# Patient Record
Sex: FEMALE | Race: WHITE | Employment: FULL TIME | URBAN - METROPOLITAN AREA
[De-identification: names, ages, dates, MRNs, and addresses within clinical notes are randomized per-mention and may not be internally consistent; named-entity substitution may affect disease eponyms.]

---

## 2019-05-14 ENCOUNTER — HOSPITAL ENCOUNTER (OUTPATIENT)
Dept: GENERAL RADIOLOGY | Age: 34
Discharge: HOME | End: 2019-05-14
Attending: OBSTETRICS & GYNECOLOGY
Payer: COMMERCIAL

## 2019-05-14 DIAGNOSIS — N92.6 IRREGULAR MENSES: ICD-10-CM

## 2019-05-14 PROCEDURE — 74011636320 HC RX REV CODE- 636/320: Performed by: OBSTETRICS & GYNECOLOGY

## 2019-05-14 PROCEDURE — 58340 CATHETER FOR HYSTEROGRAPHY: CPT

## 2019-05-14 RX ADMIN — IOPAMIDOL 10 ML: 612 INJECTION, SOLUTION INTRAVENOUS at 08:36

## 2019-07-30 LAB
HBSAG, EXTERNAL: NEGATIVE
HIV, EXTERNAL: NEGATIVE
RPR, EXTERNAL: NON REACTIVE
RUBELLA, EXTERNAL: NORMAL
TYPE, ABO & RH, EXTERNAL: NORMAL

## 2020-02-05 LAB — GRBS, EXTERNAL: NEGATIVE

## 2020-02-23 ENCOUNTER — HOSPITAL ENCOUNTER (EMERGENCY)
Age: 35
Discharge: HOME | End: 2020-02-23
Attending: OBSTETRICS & GYNECOLOGY | Admitting: OBSTETRICS & GYNECOLOGY
Payer: COMMERCIAL

## 2020-02-23 VITALS
DIASTOLIC BLOOD PRESSURE: 64 MMHG | HEART RATE: 79 BPM | HEIGHT: 66 IN | TEMPERATURE: 98 F | SYSTOLIC BLOOD PRESSURE: 111 MMHG | RESPIRATION RATE: 16 BRPM | WEIGHT: 186 LBS | BODY MASS INDEX: 29.89 KG/M2

## 2020-02-23 LAB
ALBUMIN SERPL-MCNC: 2.6 G/DL (ref 3.4–5)
ALBUMIN/GLOB SERPL: 0.7 {RATIO} (ref 0.8–1.7)
ALP SERPL-CCNC: 193 U/L (ref 45–117)
ALT SERPL-CCNC: 16 U/L (ref 13–56)
ANION GAP SERPL CALC-SCNC: 9 MMOL/L (ref 3–18)
APPEARANCE UR: CLEAR
AST SERPL-CCNC: 14 U/L (ref 10–38)
BILIRUB SERPL-MCNC: 0.2 MG/DL (ref 0.2–1)
BILIRUB UR QL: NEGATIVE
BUN SERPL-MCNC: 14 MG/DL (ref 7–18)
BUN/CREAT SERPL: 23 (ref 12–20)
CALCIUM SERPL-MCNC: 8.2 MG/DL (ref 8.5–10.1)
CHLORIDE SERPL-SCNC: 107 MMOL/L (ref 100–111)
CO2 SERPL-SCNC: 22 MMOL/L (ref 21–32)
COLOR UR: YELLOW
CREAT SERPL-MCNC: 0.6 MG/DL (ref 0.6–1.3)
ERYTHROCYTE [DISTWIDTH] IN BLOOD BY AUTOMATED COUNT: 13.4 % (ref 11.6–14.5)
GLOBULIN SER CALC-MCNC: 3.5 G/DL (ref 2–4)
GLUCOSE SERPL-MCNC: 95 MG/DL (ref 74–99)
GLUCOSE UR QL STRIP.AUTO: NEGATIVE MG/DL
HCT VFR BLD AUTO: 30.7 % (ref 35–45)
HGB BLD-MCNC: 9.9 G/DL (ref 12–16)
KETONES UR-MCNC: NEGATIVE MG/DL
LEUKOCYTE ESTERASE UR QL STRIP: NEGATIVE
MCH RBC QN AUTO: 27.6 PG (ref 24–34)
MCHC RBC AUTO-ENTMCNC: 32.2 G/DL (ref 31–37)
MCV RBC AUTO: 85.5 FL (ref 74–97)
NITRITE UR QL: NEGATIVE
PH UR: 6.5 [PH] (ref 5–9)
PLATELET # BLD AUTO: 193 K/UL (ref 135–420)
PMV BLD AUTO: 11.9 FL (ref 9.2–11.8)
POTASSIUM SERPL-SCNC: 3.8 MMOL/L (ref 3.5–5.5)
PROT SERPL-MCNC: 6.1 G/DL (ref 6.4–8.2)
PROT UR QL: NEGATIVE MG/DL
RBC # BLD AUTO: 3.59 M/UL (ref 4.2–5.3)
RBC # UR STRIP: NEGATIVE /UL
SERVICE CMNT-IMP: NORMAL
SODIUM SERPL-SCNC: 138 MMOL/L (ref 136–145)
SP GR UR: 1.01 (ref 1–1.02)
URATE SERPL-MCNC: 4.3 MG/DL (ref 2.6–7.2)
UROBILINOGEN UR QL: 0.2 EU/DL (ref 0.2–1)
WBC # BLD AUTO: 8.9 K/UL (ref 4.6–13.2)

## 2020-02-23 PROCEDURE — 83615 LACTATE (LD) (LDH) ENZYME: CPT

## 2020-02-23 PROCEDURE — 81003 URINALYSIS AUTO W/O SCOPE: CPT

## 2020-02-23 PROCEDURE — 59025 FETAL NON-STRESS TEST: CPT

## 2020-02-23 PROCEDURE — 84550 ASSAY OF BLOOD/URIC ACID: CPT

## 2020-02-23 PROCEDURE — 85027 COMPLETE CBC AUTOMATED: CPT

## 2020-02-23 PROCEDURE — 80053 COMPREHEN METABOLIC PANEL: CPT

## 2020-02-23 PROCEDURE — 74011250637 HC RX REV CODE- 250/637: Performed by: ADVANCED PRACTICE MIDWIFE

## 2020-02-23 PROCEDURE — 99282 EMERGENCY DEPT VISIT SF MDM: CPT

## 2020-02-23 RX ORDER — BUTALBITAL, ACETAMINOPHEN AND CAFFEINE 50; 325; 40 MG/1; MG/1; MG/1
1 TABLET ORAL ONCE
Status: COMPLETED | OUTPATIENT
Start: 2020-02-23 | End: 2020-02-23

## 2020-02-23 RX ORDER — CETIRIZINE HCL 10 MG
10 TABLET ORAL
COMMUNITY

## 2020-02-23 RX ADMIN — BUTALBITAL, ACETAMINOPHEN, AND CAFFEINE 1 TABLET: 50; 325; 40 TABLET ORAL at 19:51

## 2020-02-24 LAB — LDH SERPL L TO P-CCNC: 159 U/L (ref 81–234)

## 2020-02-24 NOTE — DISCHARGE INSTRUCTIONS
Patient Education   Patient Education   Patient Education        Week 37 of Your Pregnancy: Care Instructions  Your Care Instructions    You are near the end of your pregnancy--and you're probably pretty uncomfortable. It may be harder to walk around. Lying down probably isn't comfortable either. You may have trouble getting to sleep or staying asleep. Most women deliver their babies between 40 and 41 weeks. This is a good time to think about packing a bag for the hospital with items you'll need. Then you'll be ready when labor starts. Follow-up care is a key part of your treatment and safety. Be sure to make and go to all appointments, and call your doctor if you are having problems. It's also a good idea to know your test results and keep a list of the medicines you take. How can you care for yourself at home? Learn about breastfeeding  · Breastfeeding is best for your baby and good for you. · Breast milk has antibodies to help your baby fight infections. · Mothers who breastfeed often lose weight faster, because making milk burns calories. · Learning the best ways to hold your baby will make breastfeeding easier. · Let your partner bathe and diaper the baby to keep your partner from feeling left out. Snuggle together when you breastfeed. · You may want to learn how to use a breast pump and store your milk. · If you choose to bottle feed, make the feeding feel like breastfeeding so you can bond with your baby. Always hold your baby and the bottle. Do not prop bottles or let your baby fall asleep with a bottle. Learn about crying  · It is common for babies to cry for 1 to 3 hours a day. Some cry more, some cry less. · Babies don't cry to make you upset or because you are a bad parent. · Crying is how your baby communicates. Your baby may be hungry; have gas; need a diaper change; or feel cold, warm, tired, lonely, or tense. Sometimes babies cry for unknown reasons.   · If you respond to your baby's needs, he or she will learn to trust you. · Try to stay calm when your baby cries. Your baby may get more upset if he or she senses that you are upset. Know how to care for your   · Your baby's umbilical cord stump will drop off on its own, usually between 1 and 2 weeks. To care for your baby's umbilical cord area:  ? Clean the area at the bottom of the cord 2 or 3 times a day. ? Pay special attention to the area where the cord attaches to the skin. ? Keep the diaper folded below the cord. ? Use a damp washcloth or cotton ball to sponge bathe your baby until the stump has come off. · Your baby's first dark stool is called meconium. After the meconium is passed, your baby will develop his or her own bowel pattern. ? Some babies, especially  babies, have several bowel movements a day. Others have one or two a day, or one every 2 to 3 days. ?  babies often have loose, yellow stools. Formula-fed babies have more formed stools. ? If your baby's stools look like little pellets, he or she is constipated. After 2 days of constipation, call your baby's doctor. · If your baby will be circumcised, you can care for him at home. ? Gently rinse his penis with warm water after every diaper change. Do not try to remove the film that forms on the penis. This film will go away on its own. Pat dry. ? Put petroleum ointment, such as Vaseline, on the area of the diaper that will touch your baby's penis. This will keep the diaper from sticking to your baby. ? Ask the doctor about giving your baby acetaminophen (Tylenol) for pain. Where can you learn more? Go to http://layla-avery.info/. Enter 68 21 [id number] in the search box to learn more about \"Week 37 of Your Pregnancy: Care Instructions. \"  Current as of: May 29, 2019  Content Version: 12.2  © 1708-8666 [employer].  Care instructions adapted under license by [employer] (which disclaims liability or warranty for this information). If you have questions about a medical condition or this instruction, always ask your healthcare professional. [address] any warranty or liability for your use of this information. Migraine Headache: Care Instructions  Your Care Instructions  Migraines are painful, throbbing headaches that often start on one side of the head. They may cause nausea and vomiting and make you sensitive to light, sound, or smell. Without treatment, migraines can last from 4 hours to a few days. Medicines can help prevent migraines or stop them after they have started. Your doctor can help you find which ones work best for you. Follow-up care is a key part of your treatment and safety. Be sure to make and go to all appointments, and call your doctor if you are having problems. It's also a good idea to know your test results and keep a list of the medicines you take. How can you care for yourself at home? · Do not drive if you have taken a prescription pain medicine. · Rest in a quiet, dark room until your headache is gone. Close your eyes, and try to relax or go to sleep. Don't watch TV or read. · Put a cold, moist cloth or cold pack on the painful area for 10 to 20 minutes at a time. Put a thin cloth between the cold pack and your skin. · Use a warm, moist towel or a heating pad set on low to relax tight shoulder and neck muscles. · Have someone gently massage your neck and shoulders. · Take your medicines exactly as prescribed. Call your doctor if you think you are having a problem with your medicine. You will get more details on the specific medicines your doctor prescribes. · Be careful not to take pain medicine more often than the instructions allow. You could get worse or more frequent headaches when the medicine wears off. To prevent migraines  · Keep a headache diary so you can figure out what triggers your headaches.  Avoiding triggers may help you prevent headaches. Record when each headache began, how long it lasted, and what the pain was like. (Was it throbbing, aching, stabbing, or dull?) Write down any other symptoms you had with the headache, such as nausea, flashing lights or dark spots, or sensitivity to bright light or loud noise. Note if the headache occurred near your period. List anything that might have triggered the headache. Triggers may include certain foods (chocolate, cheese, wine) or odors, smoke, bright light, stress, or lack of sleep. · If your doctor has prescribed medicine for your migraines, take it as directed. You may have medicine that you take only when you get a migraine and medicine that you take all the time to help prevent migraines. ? If your doctor has prescribed medicine for when you get a headache, take it at the first sign of a migraine, unless your doctor has given you other instructions. ? If your doctor has prescribed medicine to prevent migraines, take it exactly as prescribed. Call your doctor if you think you are having a problem with your medicine. · Find healthy ways to deal with stress. Migraines are most common during or right after stressful times. Take time to relax before and after you do something that has caused a migraine in the past.  · Try to keep your muscles relaxed by keeping good posture. Check your jaw, face, neck, and shoulder muscles for tension. Try to relax them. When you sit at a desk, change positions often. And make sure to stretch for 30 seconds each hour. · Get plenty of sleep and exercise. · Eat meals on a regular schedule. Avoid foods and drinks that often trigger migraines. These include chocolate, alcohol (especially red wine and port), aspartame, monosodium glutamate (MSG), and some additives found in foods (such as hot dogs, manriquez, cold cuts, aged cheeses, and pickled foods). · Limit caffeine. Don't drink too much coffee, tea, or soda. But don't quit caffeine suddenly.  That can also give you migraines. · Do not smoke or allow others to smoke around you. If you need help quitting, talk to your doctor about stop-smoking programs and medicines. These can increase your chances of quitting for good. · If you are taking birth control pills or hormone therapy, talk to your doctor about whether they are triggering your migraines. When should you call for help? Call 911 anytime you think you may need emergency care. For example, call if:    · You have signs of a stroke. These may include:  ? Sudden numbness, paralysis, or weakness in your face, arm, or leg, especially on only one side of your body. ? Sudden vision changes. ? Sudden trouble speaking. ? Sudden confusion or trouble understanding simple statements. ? Sudden problems with walking or balance. ? A sudden, severe headache that is different from past headaches.    Call your doctor now or seek immediate medical care if:    · You have new or worse nausea and vomiting.     · You have a new or higher fever.     · Your headache gets much worse.    Watch closely for changes in your health, and be sure to contact your doctor if:    · You are not getting better after 2 days (48 hours). Where can you learn more? Go to http://layla-avery.info/. Enter G343 in the search box to learn more about \"Migraine Headache: Care Instructions. \"  Current as of: March 28, 2019  Content Version: 12.2  © 0913-3113 [employer]. Care instructions adapted under license by [employer] (which disclaims liability or warranty for this information). If you have questions about a medical condition or this instruction, always ask your healthcare professional. [place] [address] any warranty or liability for your use of this information. Headache: Care Instructions  Your Care Instructions    Headaches have many possible causes.  Most headaches aren't a sign of a more serious problem, and they will get better on their own. Home treatment may help you feel better faster. The doctor has checked you carefully, but problems can develop later. If you notice any problems or new symptoms, get medical treatment right away. Follow-up care is a key part of your treatment and safety. Be sure to make and go to all appointments, and call your doctor if you are having problems. It's also a good idea to know your test results and keep a list of the medicines you take. How can you care for yourself at home? · Do not drive if you have taken a prescription pain medicine. · Rest in a quiet, dark room until your headache is gone. Close your eyes and try to relax or go to sleep. Don't watch TV or read. · Put a cold, moist cloth or cold pack on the painful area for 10 to 20 minutes at a time. Put a thin cloth between the cold pack and your skin. · Use a warm, moist towel or a heating pad set on low to relax tight shoulder and neck muscles. · Have someone gently massage your neck and shoulders. · Take pain medicines exactly as directed. ? If the doctor gave you a prescription medicine for pain, take it as prescribed. ? If you are not taking a prescription pain medicine, ask your doctor if you can take an over-the-counter medicine. · Be careful not to take pain medicine more often than the instructions allow, because you may get worse or more frequent headaches when the medicine wears off. · Do not ignore new symptoms that occur with a headache, such as a fever, weakness or numbness, vision changes, or confusion. These may be signs of a more serious problem. To prevent headaches  · Keep a headache diary so you can figure out what triggers your headaches. Avoiding triggers may help you prevent headaches. Record when each headache began, how long it lasted, and what the pain was like (throbbing, aching, stabbing, or dull).  Write down any other symptoms you had with the headache, such as nausea, flashing lights or dark spots, or sensitivity to bright light or loud noise. Note if the headache occurred near your period. List anything that might have triggered the headache, such as certain foods (chocolate, cheese, wine) or odors, smoke, bright light, stress, or lack of sleep. · Find healthy ways to deal with stress. Headaches are most common during or right after stressful times. Take time to relax before and after you do something that has caused a headache in the past.  · Try to keep your muscles relaxed by keeping good posture. Check your jaw, face, neck, and shoulder muscles for tension, and try relaxing them. When sitting at a desk, change positions often, and stretch for 30 seconds each hour. · Get plenty of sleep and exercise. · Eat regularly and well. Long periods without food can trigger a headache. · Treat yourself to a massage. Some people find that regular massages are very helpful in relieving tension. · Limit caffeine by not drinking too much coffee, tea, or soda. But don't quit caffeine suddenly, because that can also give you headaches. · Reduce eyestrain from computers by blinking frequently and looking away from the computer screen every so often. Make sure you have proper eyewear and that your monitor is set up properly, about an arm's length away. · Seek help if you have depression or anxiety. Your headaches may be linked to these conditions. Treatment can both prevent headaches and help with symptoms of anxiety or depression. When should you call for help? Call 911 anytime you think you may need emergency care. For example, call if:    · You have signs of a stroke. These may include:  ? Sudden numbness, paralysis, or weakness in your face, arm, or leg, especially on only one side of your body. ? Sudden vision changes. ? Sudden trouble speaking. ? Sudden confusion or trouble understanding simple statements. ? Sudden problems with walking or balance.   ? A sudden, severe headache that is different from past headaches.    Call your doctor now or seek immediate medical care if:    · You have a new or worse headache.     · Your headache gets much worse. Where can you learn more? Go to http://layla-avery.info/. Enter M271 in the search box to learn more about \"Headache: Care Instructions. \"  Current as of: March 28, 2019  Content Version: 12.2  © 1578-7559 [employer]. Care instructions adapted under license by [employer] (which disclaims liability or warranty for this information). If you have questions about a medical condition or this instruction, always ask your healthcare professional. [place] [address] any warranty or liability for your use of this information.

## 2020-02-24 NOTE — PROGRESS NOTES
C/o f headache 08/10. Stabbing pain from right temple moving to the back. Pt states she took tylenol with no relief. She some times has sinus headaches, but this time it feels different. Pt denies blood pressure problems, no bleeding or leaking any fluid. No swelling noted in extremeties    0736 Spoke with FLORY Copeland CNM order for pih labs 1 Fioricet    0900:  PIH labs are back, pt states Fioricet did not help h/a    0910: Spoke with FLORY Copeland CNM re pt status. Order to discharge pt home to rest and take medication for sinuses    0926:  Pt states that fiorecet did start to help she scales her pain at 03/10.   Discharge instructions discussed with pt, pt expressed understanding and ambulated off unit with spouse

## 2020-02-24 NOTE — PROGRESS NOTES
1850  at 39.1 weeks arrives to unit with complaints of a HA. Pt taken to triage 3 for assessment. 190 Bedside and verbal report given to HAYLIE Hampton

## 2020-02-25 ENCOUNTER — HOSPITAL ENCOUNTER (INPATIENT)
Age: 35
LOS: 4 days | Discharge: HOME | End: 2020-02-29
Attending: OBSTETRICS & GYNECOLOGY | Admitting: OBSTETRICS & GYNECOLOGY
Payer: COMMERCIAL

## 2020-02-25 PROBLEM — Z34.93 NORMAL IUP (INTRAUTERINE PREGNANCY) ON PRENATAL ULTRASOUND, THIRD TRIMESTER: Status: ACTIVE | Noted: 2020-02-25

## 2020-02-25 PROBLEM — R51.9 HEADACHE IN PREGNANCY, ANTEPARTUM, THIRD TRIMESTER: Status: ACTIVE | Noted: 2020-02-25

## 2020-02-25 PROBLEM — O26.893 HEADACHE IN PREGNANCY, ANTEPARTUM, THIRD TRIMESTER: Status: ACTIVE | Noted: 2020-02-25

## 2020-02-25 LAB
ABO + RH BLD: NORMAL
ALBUMIN SERPL-MCNC: 2.4 G/DL (ref 3.4–5)
ALBUMIN/GLOB SERPL: 0.6 {RATIO} (ref 0.8–1.7)
ALP SERPL-CCNC: 197 U/L (ref 45–117)
ALT SERPL-CCNC: 18 U/L (ref 13–56)
ANION GAP SERPL CALC-SCNC: 7 MMOL/L (ref 3–18)
AST SERPL-CCNC: 21 U/L (ref 10–38)
BASOPHILS # BLD: 0 K/UL (ref 0–0.1)
BASOPHILS NFR BLD: 0 % (ref 0–2)
BILIRUB SERPL-MCNC: 0.2 MG/DL (ref 0.2–1)
BLOOD GROUP ANTIBODIES SERPL: NORMAL
BUN SERPL-MCNC: 12 MG/DL (ref 7–18)
BUN/CREAT SERPL: 16 (ref 12–20)
CALCIUM SERPL-MCNC: 8.3 MG/DL (ref 8.5–10.1)
CHLORIDE SERPL-SCNC: 108 MMOL/L (ref 100–111)
CO2 SERPL-SCNC: 23 MMOL/L (ref 21–32)
CREAT SERPL-MCNC: 0.74 MG/DL (ref 0.6–1.3)
DIFFERENTIAL METHOD BLD: ABNORMAL
EOSINOPHIL # BLD: 0.1 K/UL (ref 0–0.4)
EOSINOPHIL NFR BLD: 1 % (ref 0–5)
ERYTHROCYTE [DISTWIDTH] IN BLOOD BY AUTOMATED COUNT: 13.6 % (ref 11.6–14.5)
GLOBULIN SER CALC-MCNC: 3.7 G/DL (ref 2–4)
GLUCOSE SERPL-MCNC: 80 MG/DL (ref 74–99)
HCT VFR BLD AUTO: 29.7 % (ref 35–45)
HGB BLD-MCNC: 9.6 G/DL (ref 12–16)
LDH SERPL L TO P-CCNC: 206 U/L (ref 81–234)
LYMPHOCYTES # BLD: 1.6 K/UL (ref 0.9–3.6)
LYMPHOCYTES NFR BLD: 16 % (ref 21–52)
MCH RBC QN AUTO: 27.7 PG (ref 24–34)
MCHC RBC AUTO-ENTMCNC: 32.3 G/DL (ref 31–37)
MCV RBC AUTO: 85.6 FL (ref 74–97)
MONOCYTES # BLD: 0.6 K/UL (ref 0.05–1.2)
MONOCYTES NFR BLD: 6 % (ref 3–10)
NEUTS SEG # BLD: 7.8 K/UL (ref 1.8–8)
NEUTS SEG NFR BLD: 77 % (ref 40–73)
PLATELET # BLD AUTO: 200 K/UL (ref 135–420)
PMV BLD AUTO: 12.2 FL (ref 9.2–11.8)
POTASSIUM SERPL-SCNC: 4.4 MMOL/L (ref 3.5–5.5)
PROT SERPL-MCNC: 6.1 G/DL (ref 6.4–8.2)
RBC # BLD AUTO: 3.47 M/UL (ref 4.2–5.3)
SODIUM SERPL-SCNC: 138 MMOL/L (ref 136–145)
SPECIMEN EXP DATE BLD: NORMAL
URATE SERPL-MCNC: 4.8 MG/DL (ref 2.6–7.2)
WBC # BLD AUTO: 10.1 K/UL (ref 4.6–13.2)

## 2020-02-25 PROCEDURE — 84550 ASSAY OF BLOOD/URIC ACID: CPT

## 2020-02-25 PROCEDURE — 80053 COMPREHEN METABOLIC PANEL: CPT

## 2020-02-25 PROCEDURE — 59200 INSERT CERVICAL DILATOR: CPT

## 2020-02-25 PROCEDURE — 86900 BLOOD TYPING SEROLOGIC ABO: CPT

## 2020-02-25 PROCEDURE — 85025 COMPLETE CBC W/AUTO DIFF WBC: CPT

## 2020-02-25 PROCEDURE — 74011250636 HC RX REV CODE- 250/636: Performed by: MIDWIFE

## 2020-02-25 PROCEDURE — 59025 FETAL NON-STRESS TEST: CPT

## 2020-02-25 PROCEDURE — 77030028565 HC CATH CERV RIPNG BLN COOK -B

## 2020-02-25 PROCEDURE — 83615 LACTATE (LD) (LDH) ENZYME: CPT

## 2020-02-25 PROCEDURE — 65270000029 HC RM PRIVATE

## 2020-02-25 RX ORDER — NALBUPHINE HYDROCHLORIDE 10 MG/ML
10 INJECTION, SOLUTION INTRAMUSCULAR; INTRAVENOUS; SUBCUTANEOUS
Status: DISCONTINUED | OUTPATIENT
Start: 2020-02-25 | End: 2020-02-26 | Stop reason: HOSPADM

## 2020-02-25 RX ORDER — BUTORPHANOL TARTRATE 2 MG/ML
2 INJECTION INTRAMUSCULAR; INTRAVENOUS
Status: DISCONTINUED | OUTPATIENT
Start: 2020-02-25 | End: 2020-02-26 | Stop reason: HOSPADM

## 2020-02-25 RX ORDER — LIDOCAINE HYDROCHLORIDE 10 MG/ML
20 INJECTION, SOLUTION EPIDURAL; INFILTRATION; INTRACAUDAL; PERINEURAL AS NEEDED
Status: DISCONTINUED | OUTPATIENT
Start: 2020-02-25 | End: 2020-02-26 | Stop reason: HOSPADM

## 2020-02-25 RX ORDER — METHYLERGONOVINE MALEATE 0.2 MG/ML
0.2 INJECTION INTRAVENOUS AS NEEDED
Status: DISCONTINUED | OUTPATIENT
Start: 2020-02-25 | End: 2020-02-26 | Stop reason: HOSPADM

## 2020-02-25 RX ORDER — TERBUTALINE SULFATE 1 MG/ML
0.25 INJECTION SUBCUTANEOUS
Status: DISCONTINUED | OUTPATIENT
Start: 2020-02-25 | End: 2020-02-26 | Stop reason: HOSPADM

## 2020-02-25 RX ORDER — MINERAL OIL
30 OIL (ML) ORAL AS NEEDED
Status: DISCONTINUED | OUTPATIENT
Start: 2020-02-25 | End: 2020-02-26 | Stop reason: HOSPADM

## 2020-02-25 RX ORDER — SODIUM CHLORIDE, SODIUM LACTATE, POTASSIUM CHLORIDE, CALCIUM CHLORIDE 600; 310; 30; 20 MG/100ML; MG/100ML; MG/100ML; MG/100ML
125 INJECTION, SOLUTION INTRAVENOUS CONTINUOUS
Status: DISCONTINUED | OUTPATIENT
Start: 2020-02-25 | End: 2020-02-26 | Stop reason: HOSPADM

## 2020-02-25 RX ORDER — OXYTOCIN/0.9 % SODIUM CHLORIDE 20/1000 ML
125 PLASTIC BAG, INJECTION (ML) INTRAVENOUS CONTINUOUS
Status: DISCONTINUED | OUTPATIENT
Start: 2020-02-25 | End: 2020-02-26 | Stop reason: HOSPADM

## 2020-02-25 RX ORDER — OXYTOCIN/0.9 % SODIUM CHLORIDE 20/1000 ML
999 PLASTIC BAG, INJECTION (ML) INTRAVENOUS ONCE
Status: ACTIVE | OUTPATIENT
Start: 2020-02-25 | End: 2020-02-25

## 2020-02-25 RX ORDER — ONDANSETRON 2 MG/ML
4 INJECTION INTRAMUSCULAR; INTRAVENOUS
Status: DISCONTINUED | OUTPATIENT
Start: 2020-02-25 | End: 2020-02-26 | Stop reason: HOSPADM

## 2020-02-25 RX ADMIN — BUTORPHANOL TARTRATE 2 MG: 2 INJECTION, SOLUTION INTRAMUSCULAR; INTRAVENOUS at 11:03

## 2020-02-25 NOTE — H&P
History & Physical    Name: Heather Luciano MRN: [id number]  SSN: [id number]    YOB: 1985  Age: 36 y.o. Sex: female        Subjective:     Estimated Date of Delivery: 20  OB History        1    Para        Term                AB        Living           SAB        TAB        Ectopic        Molar        Multiple        Live Births                      Ms. Brice Hoover is admitted with pregnancy at 39w3d for induction of labor d/t persistent H/A. Prenatal course was complicated by Positive quad screen, declined amnio/NIPT. Kaitlin Simpson Please see prenatal records for details. No Known Allergies    Objective:     Vitals: There were no vitals filed for this visit. Physical Exam:  Patient without distress. Back: costovertebral angle tenderness absent  Abdomen: soft, nontender  Fundus: soft and non tender  Perineum: blood absent, amniotic fluid absent  Cervical Exam: Closed/Thick/High, per office visit  Membranes:  Intact  Fetal Heart Rate & Contraction pattern: Baseline: 140 per minute  Variability: moderate  Accelerations: yes  Decelerations: none  Uterine contractions: none    Prenatal Labs:   No results found for: RUBELLAEXT, GRBSEXT, HBSAGEXT, HIVEXT, RPREXT, GONNOEXT, CHLAMEXT      Assessment/Plan:     Plan: Admit for Reassuring fetal status. IOL for persistent H/A; Bps WNL, will redraw PIH labs and treat headache. Cont EFM  IV pain meds/ELMIRA per request  Cook balloon for cervical ripening  Group B Strep was negative.     Signed By:  Mariangel Alejo CNM     2020

## 2020-02-25 NOTE — PROGRESS NOTES
Pt back from the bathroom. Nurse at bedside and EFM adjusted. Pt turned to right lateral recumbent position. Pt tolerated well. GARY Olsen CNM at bedside and pt remains right lateral recumbent position and no further orders received at this time.

## 2020-02-25 NOTE — PROGRESS NOTES
Ander Bradley CNM at bedside for SVE and placement of Cook balloon. Cook balloon placed with 80mL of sterile saline instilled into uterine balloon and 80mL of sterile saline instilled into vaginal balloon. Pt tolerated well. Pt requesting IV pain medication and stadol given per order. Pt denies further needs at this time.

## 2020-02-25 NOTE — PROGRESS NOTES
presents at 39.3 weeks gestation for induction due to headache that will not go away. EFM applied. Hx confirmed. Pt oriented to room.

## 2020-02-25 NOTE — PROCEDURES
Discussed risks and benefits of Cook Balloon catheter for cervical ripening. Patient in agreement. SVE performed, FT/TH/-3. Spec placed and cervix easily visualized. Cook balloon with stylet placed through the cervical os without difficulty. Uterine Balloon inflated with 80 CC of sterile Water. Spec removed and Vaginal Balloon inflated with 80 CC of sterile water. SVE confirmed correct placement. Patient tolerated well.     Signed By: Urbano Morrison CNM     February 25, 2020

## 2020-02-26 ENCOUNTER — ANESTHESIA (OUTPATIENT)
Dept: LABOR AND DELIVERY | Age: 35
End: 2020-02-26
Payer: COMMERCIAL

## 2020-02-26 ENCOUNTER — ANESTHESIA EVENT (OUTPATIENT)
Dept: LABOR AND DELIVERY | Age: 35
End: 2020-02-26
Payer: COMMERCIAL

## 2020-02-26 PROCEDURE — 77030031139 HC SUT VCRL2 J&J -A: Performed by: OBSTETRICS & GYNECOLOGY

## 2020-02-26 PROCEDURE — 74011250636 HC RX REV CODE- 250/636

## 2020-02-26 PROCEDURE — 74011250636 HC RX REV CODE- 250/636: Performed by: MIDWIFE

## 2020-02-26 PROCEDURE — 3E0R3BZ INTRODUCTION OF ANESTHETIC AGENT INTO SPINAL CANAL, PERCUTANEOUS APPROACH: ICD-10-PCS | Performed by: ANESTHESIOLOGY

## 2020-02-26 PROCEDURE — 77030008459 HC STPLR SKN COOP -B: Performed by: OBSTETRICS & GYNECOLOGY

## 2020-02-26 PROCEDURE — 75410000002 HC LABOR FEE PER 1 HR

## 2020-02-26 PROCEDURE — 77030040361 HC SLV COMPR DVT MDII -B

## 2020-02-26 PROCEDURE — 65270000029 HC RM PRIVATE

## 2020-02-26 PROCEDURE — 74011000250 HC RX REV CODE- 250

## 2020-02-26 PROCEDURE — 77030007879 HC KT SPN EPDRL TELE -B: Performed by: ANESTHESIOLOGY

## 2020-02-26 PROCEDURE — 75410000003 HC RECOV DEL/VAG/CSECN EA 0.5 HR

## 2020-02-26 PROCEDURE — 74011250636 HC RX REV CODE- 250/636: Performed by: ADVANCED PRACTICE MIDWIFE

## 2020-02-26 PROCEDURE — 77030032060 HC PWDR HEMSTAT ARISTA ASRB 3GM BARD -C: Performed by: OBSTETRICS & GYNECOLOGY

## 2020-02-26 PROCEDURE — 74011250636 HC RX REV CODE- 250/636: Performed by: NURSE ANESTHETIST, CERTIFIED REGISTERED

## 2020-02-26 PROCEDURE — 74011250637 HC RX REV CODE- 250/637: Performed by: MIDWIFE

## 2020-02-26 PROCEDURE — 76010000391 HC C SECN FIRST 1 HR: Performed by: OBSTETRICS & GYNECOLOGY

## 2020-02-26 PROCEDURE — 77030040361 HC SLV COMPR DVT MDII -B: Performed by: OBSTETRICS & GYNECOLOGY

## 2020-02-26 PROCEDURE — 74011250637 HC RX REV CODE- 250/637: Performed by: ADVANCED PRACTICE MIDWIFE

## 2020-02-26 PROCEDURE — 74011250636 HC RX REV CODE- 250/636: Performed by: ANESTHESIOLOGY

## 2020-02-26 PROCEDURE — 75410000003 HC RECOV DEL/VAG/CSECN EA 0.5 HR: Performed by: OBSTETRICS & GYNECOLOGY

## 2020-02-26 PROCEDURE — 76060000032 HC ANESTHESIA 0.5 TO 1 HR: Performed by: OBSTETRICS & GYNECOLOGY

## 2020-02-26 PROCEDURE — 00HU33Z INSERTION OF INFUSION DEVICE INTO SPINAL CANAL, PERCUTANEOUS APPROACH: ICD-10-PCS | Performed by: ANESTHESIOLOGY

## 2020-02-26 PROCEDURE — 3E033VJ INTRODUCTION OF OTHER HORMONE INTO PERIPHERAL VEIN, PERCUTANEOUS APPROACH: ICD-10-PCS | Performed by: MIDWIFE

## 2020-02-26 PROCEDURE — 74011000250 HC RX REV CODE- 250: Performed by: ANESTHESIOLOGY

## 2020-02-26 PROCEDURE — 74011000250 HC RX REV CODE- 250: Performed by: SPECIALIST

## 2020-02-26 PROCEDURE — 77030018836 HC SOL IRR NACL ICUM -A: Performed by: OBSTETRICS & GYNECOLOGY

## 2020-02-26 PROCEDURE — 74011250636 HC RX REV CODE- 250/636: Performed by: OBSTETRICS & GYNECOLOGY

## 2020-02-26 PROCEDURE — 77030027138 HC INCENT SPIROMETER -A

## 2020-02-26 RX ORDER — LIDOCAINE HYDROCHLORIDE AND EPINEPHRINE 15; 5 MG/ML; UG/ML
INJECTION, SOLUTION EPIDURAL AS NEEDED
Status: DISCONTINUED | OUTPATIENT
Start: 2020-02-26 | End: 2020-02-26 | Stop reason: HOSPADM

## 2020-02-26 RX ORDER — LIDOCAINE HYDROCHLORIDE AND EPINEPHRINE 20; 5 MG/ML; UG/ML
INJECTION, SOLUTION EPIDURAL; INFILTRATION; INTRACAUDAL; PERINEURAL AS NEEDED
Status: DISCONTINUED | OUTPATIENT
Start: 2020-02-26 | End: 2020-02-26 | Stop reason: HOSPADM

## 2020-02-26 RX ORDER — CEFAZOLIN SODIUM 2 G/50ML
SOLUTION INTRAVENOUS
Status: COMPLETED
Start: 2020-02-26 | End: 2020-02-26

## 2020-02-26 RX ORDER — FENTANYL CITRATE 50 UG/ML
INJECTION, SOLUTION INTRAMUSCULAR; INTRAVENOUS AS NEEDED
Status: DISCONTINUED | OUTPATIENT
Start: 2020-02-26 | End: 2020-02-26 | Stop reason: HOSPADM

## 2020-02-26 RX ORDER — OXYCODONE AND ACETAMINOPHEN 5; 325 MG/1; MG/1
1-2 TABLET ORAL
Status: DISCONTINUED | OUTPATIENT
Start: 2020-02-26 | End: 2020-02-29 | Stop reason: HOSPADM

## 2020-02-26 RX ORDER — ZOLPIDEM TARTRATE 5 MG/1
5 TABLET ORAL
Status: DISCONTINUED | OUTPATIENT
Start: 2020-02-26 | End: 2020-02-29 | Stop reason: HOSPADM

## 2020-02-26 RX ORDER — FENTANYL CITRATE 50 UG/ML
INJECTION, SOLUTION INTRAMUSCULAR; INTRAVENOUS
Status: COMPLETED
Start: 2020-02-26 | End: 2020-02-26

## 2020-02-26 RX ORDER — SODIUM CHLORIDE 0.9 % (FLUSH) 0.9 %
5-40 SYRINGE (ML) INJECTION AS NEEDED
Status: DISCONTINUED | OUTPATIENT
Start: 2020-02-26 | End: 2020-02-29 | Stop reason: HOSPADM

## 2020-02-26 RX ORDER — OXYTOCIN 10 [USP'U]/ML
INJECTION, SOLUTION INTRAMUSCULAR; INTRAVENOUS
Status: DISPENSED
Start: 2020-02-26 | End: 2020-02-26

## 2020-02-26 RX ORDER — OXYTOCIN/RINGER'S LACTATE 20/1000 ML
PLASTIC BAG, INJECTION (ML) INTRAVENOUS AS NEEDED
Status: DISCONTINUED | OUTPATIENT
Start: 2020-02-26 | End: 2020-02-26 | Stop reason: HOSPADM

## 2020-02-26 RX ORDER — FENTANYL CITRATE 50 UG/ML
100 INJECTION, SOLUTION INTRAMUSCULAR; INTRAVENOUS ONCE
Status: COMPLETED | OUTPATIENT
Start: 2020-02-26 | End: 2020-02-26

## 2020-02-26 RX ORDER — NALOXONE HYDROCHLORIDE 0.4 MG/ML
0.2 INJECTION, SOLUTION INTRAMUSCULAR; INTRAVENOUS; SUBCUTANEOUS AS NEEDED
Status: DISCONTINUED | OUTPATIENT
Start: 2020-02-26 | End: 2020-02-26 | Stop reason: HOSPADM

## 2020-02-26 RX ORDER — SODIUM CHLORIDE 0.9 % (FLUSH) 0.9 %
5-40 SYRINGE (ML) INJECTION AS NEEDED
Status: DISCONTINUED | OUTPATIENT
Start: 2020-02-26 | End: 2020-02-26 | Stop reason: HOSPADM

## 2020-02-26 RX ORDER — ACETAMINOPHEN 325 MG/1
650 TABLET ORAL
Status: DISCONTINUED | OUTPATIENT
Start: 2020-02-26 | End: 2020-02-29 | Stop reason: HOSPADM

## 2020-02-26 RX ORDER — OXYTOCIN/0.9 % SODIUM CHLORIDE 30/500 ML
0-25 PLASTIC BAG, INJECTION (ML) INTRAVENOUS
Status: DISCONTINUED | OUTPATIENT
Start: 2020-02-26 | End: 2020-02-29 | Stop reason: HOSPADM

## 2020-02-26 RX ORDER — IBUPROFEN 400 MG/1
800 TABLET ORAL
Status: DISCONTINUED | OUTPATIENT
Start: 2020-02-29 | End: 2020-02-27

## 2020-02-26 RX ORDER — SODIUM CHLORIDE 0.9 % (FLUSH) 0.9 %
5-40 SYRINGE (ML) INJECTION EVERY 8 HOURS
Status: DISCONTINUED | OUTPATIENT
Start: 2020-02-26 | End: 2020-02-26 | Stop reason: HOSPADM

## 2020-02-26 RX ORDER — CEFAZOLIN SODIUM 2 G/50ML
2 SOLUTION INTRAVENOUS ONCE
Status: COMPLETED | OUTPATIENT
Start: 2020-02-26 | End: 2020-02-26

## 2020-02-26 RX ORDER — SIMETHICONE 80 MG
80 TABLET,CHEWABLE ORAL
Status: DISCONTINUED | OUTPATIENT
Start: 2020-02-26 | End: 2020-02-29 | Stop reason: HOSPADM

## 2020-02-26 RX ORDER — SODIUM CHLORIDE 0.9 % (FLUSH) 0.9 %
5-40 SYRINGE (ML) INJECTION EVERY 8 HOURS
Status: DISCONTINUED | OUTPATIENT
Start: 2020-02-26 | End: 2020-02-29 | Stop reason: HOSPADM

## 2020-02-26 RX ORDER — SODIUM CHLORIDE, SODIUM LACTATE, POTASSIUM CHLORIDE, CALCIUM CHLORIDE 600; 310; 30; 20 MG/100ML; MG/100ML; MG/100ML; MG/100ML
125 INJECTION, SOLUTION INTRAVENOUS CONTINUOUS
Status: DISPENSED | OUTPATIENT
Start: 2020-02-26 | End: 2020-02-27

## 2020-02-26 RX ORDER — KETOROLAC TROMETHAMINE 30 MG/ML
30 INJECTION, SOLUTION INTRAMUSCULAR; INTRAVENOUS EVERY 6 HOURS
Status: DISCONTINUED | OUTPATIENT
Start: 2020-02-26 | End: 2020-02-27

## 2020-02-26 RX ORDER — FACIAL-BODY WIPES
10 EACH TOPICAL
Status: DISCONTINUED | OUTPATIENT
Start: 2020-02-26 | End: 2020-02-29 | Stop reason: HOSPADM

## 2020-02-26 RX ORDER — LORATADINE 10 MG/1
10 TABLET ORAL DAILY
Status: DISCONTINUED | OUTPATIENT
Start: 2020-02-26 | End: 2020-02-29 | Stop reason: HOSPADM

## 2020-02-26 RX ORDER — PROMETHAZINE HYDROCHLORIDE 25 MG/ML
25 INJECTION, SOLUTION INTRAMUSCULAR; INTRAVENOUS
Status: DISCONTINUED | OUTPATIENT
Start: 2020-02-26 | End: 2020-02-29 | Stop reason: HOSPADM

## 2020-02-26 RX ORDER — FENTANYL/ROPIVACAINE/NS/PF 2MCG/ML-.1
1-15 PLASTIC BAG, INJECTION (ML) EPIDURAL
Status: DISCONTINUED | OUTPATIENT
Start: 2020-02-26 | End: 2020-02-26 | Stop reason: HOSPADM

## 2020-02-26 RX ORDER — OXYTOCIN/0.9 % SODIUM CHLORIDE 20/1000 ML
125 PLASTIC BAG, INJECTION (ML) INTRAVENOUS CONTINUOUS
Status: DISCONTINUED | OUTPATIENT
Start: 2020-02-26 | End: 2020-02-29 | Stop reason: HOSPADM

## 2020-02-26 RX ORDER — FENTANYL/ROPIVACAINE/NS/PF 2MCG/ML-.1
PLASTIC BAG, INJECTION (ML) EPIDURAL
Status: COMPLETED
Start: 2020-02-26 | End: 2020-02-26

## 2020-02-26 RX ORDER — MORPHINE SULFATE 1 MG/ML
INJECTION, SOLUTION EPIDURAL; INTRATHECAL; INTRAVENOUS AS NEEDED
Status: DISCONTINUED | OUTPATIENT
Start: 2020-02-26 | End: 2020-02-26 | Stop reason: HOSPADM

## 2020-02-26 RX ORDER — SODIUM BICARBONATE 1 MEQ/ML
SYRINGE (ML) INTRAVENOUS AS NEEDED
Status: DISCONTINUED | OUTPATIENT
Start: 2020-02-26 | End: 2020-02-26 | Stop reason: HOSPADM

## 2020-02-26 RX ADMIN — Medication 10 ML: at 16:00

## 2020-02-26 RX ADMIN — FENTANYL CITRATE 100 MCG: 50 INJECTION, SOLUTION INTRAMUSCULAR; INTRAVENOUS at 06:00

## 2020-02-26 RX ADMIN — ROPIVACAINE HYDROCHLORIDE 12 ML/HR: 10 INJECTION, SOLUTION EPIDURAL at 05:53

## 2020-02-26 RX ADMIN — Medication 8 MILLI-UNITS/MIN: at 04:00

## 2020-02-26 RX ADMIN — Medication 6 MILLI-UNITS/MIN: at 03:30

## 2020-02-26 RX ADMIN — SODIUM CHLORIDE, SODIUM LACTATE, POTASSIUM CHLORIDE, AND CALCIUM CHLORIDE 125 ML/HR: 600; 310; 30; 20 INJECTION, SOLUTION INTRAVENOUS at 02:20

## 2020-02-26 RX ADMIN — SODIUM CHLORIDE, SODIUM LACTATE, POTASSIUM CHLORIDE, AND CALCIUM CHLORIDE 125 ML/HR: 600; 310; 30; 20 INJECTION, SOLUTION INTRAVENOUS at 22:28

## 2020-02-26 RX ADMIN — CEFAZOLIN SODIUM 2 G: 2 SOLUTION INTRAVENOUS at 09:30

## 2020-02-26 RX ADMIN — Medication 2 MILLI-UNITS/MIN: at 02:20

## 2020-02-26 RX ADMIN — Medication 12 ML/HR: at 05:53

## 2020-02-26 RX ADMIN — FENTANYL CITRATE 100 MCG: 50 INJECTION, SOLUTION INTRAMUSCULAR; INTRAVENOUS at 05:45

## 2020-02-26 RX ADMIN — LORATADINE 10 MG: 10 TABLET ORAL at 15:57

## 2020-02-26 RX ADMIN — SODIUM CHLORIDE, SODIUM LACTATE, POTASSIUM CHLORIDE, AND CALCIUM CHLORIDE 125 ML/HR: 600; 310; 30; 20 INJECTION, SOLUTION INTRAVENOUS at 16:15

## 2020-02-26 RX ADMIN — ONDANSETRON HYDROCHLORIDE 4 MG: 2 INJECTION INTRAMUSCULAR; INTRAVENOUS at 09:39

## 2020-02-26 RX ADMIN — KETOROLAC TROMETHAMINE 30 MG: 30 INJECTION, SOLUTION INTRAMUSCULAR at 22:28

## 2020-02-26 RX ADMIN — MORPHINE SULFATE 3 MG: 1 INJECTION, SOLUTION EPIDURAL; INTRATHECAL; INTRAVENOUS at 09:45

## 2020-02-26 RX ADMIN — LIDOCAINE HYDROCHLORIDE,EPINEPHRINE BITARTRATE 5 ML: 20; .005 INJECTION, SOLUTION EPIDURAL; INFILTRATION; INTRACAUDAL; PERINEURAL at 09:24

## 2020-02-26 RX ADMIN — SODIUM CHLORIDE, SODIUM LACTATE, POTASSIUM CHLORIDE, AND CALCIUM CHLORIDE 125 ML/HR: 600; 310; 30; 20 INJECTION, SOLUTION INTRAVENOUS at 05:45

## 2020-02-26 RX ADMIN — Medication 400 ML: at 09:44

## 2020-02-26 RX ADMIN — KETOROLAC TROMETHAMINE 30 MG: 30 INJECTION, SOLUTION INTRAMUSCULAR at 15:57

## 2020-02-26 RX ADMIN — LIDOCAINE HYDROCHLORIDE,EPINEPHRINE BITARTRATE 4 ML: 20; .005 INJECTION, SOLUTION EPIDURAL; INFILTRATION; INTRACAUDAL; PERINEURAL at 09:18

## 2020-02-26 RX ADMIN — Medication 12 ML/HR: at 07:32

## 2020-02-26 RX ADMIN — SODIUM BICARBONATE 1 MEQ: 84 INJECTION, SOLUTION INTRAVENOUS at 09:16

## 2020-02-26 RX ADMIN — SODIUM BICARBONATE 1 MEQ: 84 INJECTION, SOLUTION INTRAVENOUS at 09:18

## 2020-02-26 RX ADMIN — LIDOCAINE HYDROCHLORIDE,EPINEPHRINE BITARTRATE 4 ML: 20; .005 INJECTION, SOLUTION EPIDURAL; INFILTRATION; INTRACAUDAL; PERINEURAL at 09:16

## 2020-02-26 RX ADMIN — Medication 125 ML/HR: at 09:34

## 2020-02-26 RX ADMIN — SODIUM CHLORIDE, SODIUM LACTATE, POTASSIUM CHLORIDE, AND CALCIUM CHLORIDE 1000 ML: 600; 310; 30; 20 INJECTION, SOLUTION INTRAVENOUS at 05:00

## 2020-02-26 RX ADMIN — BUTORPHANOL TARTRATE 2 MG: 2 INJECTION, SOLUTION INTRAMUSCULAR; INTRAVENOUS at 04:05

## 2020-02-26 RX ADMIN — BENZOCAINE AND MENTHOL 1 LOZENGE: 15; 3.6 LOZENGE ORAL at 03:10

## 2020-02-26 RX ADMIN — LIDOCAINE HYDROCHLORIDE,EPINEPHRINE BITARTRATE 3 ML: 15; .005 INJECTION, SOLUTION EPIDURAL; INFILTRATION; INTRACAUDAL; PERINEURAL at 05:44

## 2020-02-26 NOTE — INTERVAL H&P NOTE
H&P Update: 
Anca Joel was seen and examined. History and physical has been reviewed. Significant clinical changes have occurred as noted:  Patient is now in breech presentation. Attempted ECV without success after discussing it with the patient and family. Will proceed to LTCS. This procedure has been fully reviewed with the patient and written informed consent has been obtained.

## 2020-02-26 NOTE — PROGRESS NOTES
Labor Progress Note  Patient seen, fetal heart rate and contraction pattern evaluated, patient examined. No data found. Physical Exam:  Cervical Exam:  8 cm dilated    100% effaced    0 station    Presenting Part: breech  Membranes:  Spontaneous Rupture of Membranes; Amniotic Fluid: clear fluid, copious amounts  Uterine Activity: q2-3 min, 40-60 seconds, soft resting tone  Fetal Heart Rate: Baseline: 160 per minute  Variability: moderate  Accelerations: no  Decelerations: variable and prolonged    Assessment/Plan:  Called to bedside d/t SROM and patient feeling pressure. Found to be 8/100/0 breech presentation confirmed with BSUS. Notified Dr Ga Cunningham who is en route for PLTCS. Discussed risks and benefits of C/S for breech presentation including injury to bowel and bladder, infection, and bleeding with patient and . In agreement with proceeding with C/S.  Anesthesia notified and to move to OR emergently    [address] [place] [address]

## 2020-02-26 NOTE — PROGRESS NOTES
Problem: Patient Education: Go to Patient Education Activity  Goal: Patient/Family Education  Outcome: Progressing Towards Goal     Problem: Pain  Goal: *Control of Pain  Outcome: Progressing Towards Goal     Problem: Patient Education: Go to Patient Education Activity  Goal: Patient/Family Education  Outcome: Progressing Towards Goal     Problem:  Delivery: Day of Delivery  Goal: Activity/Safety  Outcome: Progressing Towards Goal  Goal: Consults, if ordered  Outcome: Progressing Towards Goal  Goal: Diagnostic Test/Procedures  Outcome: Progressing Towards Goal  Goal: Nutrition/Diet  Outcome: Progressing Towards Goal  Goal: Discharge Planning  Outcome: Progressing Towards Goal  Goal: Medications  Outcome: Progressing Towards Goal  Goal: Respiratory  Outcome: Progressing Towards Goal  Goal: Treatments/Interventions/Procedures  Outcome: Progressing Towards Goal  Goal: Psychosocial  Outcome: Progressing Towards Goal  Goal: *Vital signs within defined limits  Outcome: Progressing Towards Goal  Goal: *Labs within defined limits  Outcome: Progressing Towards Goal  Goal: *Hemodynamically stable  Outcome: Progressing Towards Goal  Goal: *Optimal pain control at patient's stated goal  Outcome: Progressing Towards Goal  Goal: *Participates in infant care  Outcome: Progressing Towards Goal  Goal: *Demonstrates progressive activity  Outcome: Progressing Towards Goal  Goal: *Tolerating diet  Outcome: Progressing Towards Goal

## 2020-02-26 NOTE — PROGRESS NOTES
0720 Bedside and Verbal shift change report given to Kaycee Waddell RN   (oncoming nurse) by Ricki Libman RN (offgoing nurse). Report included the following information SBAR, Kardex, Procedure Summary, Intake/Output, MAR, Recent Results and Med Rec Status. 0827 SROM for clear fluids. 0830 SVE 5/100/0 still feels like a bag of fluild around fetus. 0673 Pitocin stopped, bedside U/S performed by GARY Olsen CNM, fetus noted to be breech presentation. 200 Dr. Lizz Villegas notified, c-section called. 0919 Pt transferred to OR via bed by this nurse and Dr. Corrinne Gayer. Arrived in [address] Delivery of liveborn female infant. 5940 Removal of placenta.

## 2020-02-26 NOTE — PROGRESS NOTES
1210  TRANSFER - IN REPORT:    Verbal report received from Garrett Wilson RN (name) on Nayely Hernandez  being received from Labor and Delivery (unit) for routine progression of care      Report consisted of patients Situation, Background, Assessment and   Recommendations(SBAR). Information from the following report(s) SBAR, Kardex, Intake/Output, MAR and Recent Results was reviewed with the receiving nurse. Opportunity for questions and clarification was provided. Assessment completed upon patients arrival to unit and care assumed. Told in report that toradol given about 10. Message sent to pharmacy to adjust time. 1220  Completed assessment, brought patient fresh ice water. No further needs at this time. 1330  Rounded on patient, no further needs at this time. [address] patient orange juice, apple sauce, and popsicles because she was feeling a little lightheaded since not eating. Tolerated peanut butter fine an hour ago so advanced diet order. No further needs at this time. 1530  Patient complaining of facial itching. Spoke with AGRY Olsen CNM, who ordered claritin 10mg PO daily. 1600  Gave patient scheduled toradol and claritin. 1615  Completed assessment and hung LR. No further needs at this time. 1730  Reassessed pain, no further needs at this time. 1845  Rounded on patient, no further needs at this time. 1910  Bedside and Verbal shift change report given to RAKESH Aguirre RN (oncoming nurse) by BRENDAN Crabtree RN (offgoing nurse). Report included the following information SBAR, Kardex, Intake/Output, MAR and Recent Results.

## 2020-02-26 NOTE — ANESTHESIA POSTPROCEDURE EVALUATION
Procedure(s):   SECTION. epidural    Anesthesia Post Evaluation        Comments: Post-Anesthesia Evaluation and Assessment    Cardiovascular Function/Vital Signs  /60   Pulse 97   Temp 37.1 °C (98.8 °F)   Resp 17   Ht 5' 6\" (1.676 m)   Wt 84.4 kg (186 lb)   SpO2 99%   Breastfeeding Unknown   BMI 30.02 kg/m²     Patient is status post Procedure(s):   SECTION. Nausea/Vomiting: Controlled. Postoperative hydration reviewed and adequate. Pain:  Pain Scale 1: Numeric (0 - 10) (20 1045)  Pain Intensity 1: 0 (20 1045)   Managed. Neurological Status:   Neuro (WDL): Within Defined Limits (20 1930)   At baseline. Mental Status and Level of Consciousness: Arousable. Pulmonary Status:   O2 Device: Room air (20 1004)   Adequate oxygenation and airway patent. Complications related to anesthesia: None    Post-anesthesia assessment completed. No concerns. Patient has met all discharge requirements. Signed By: Ashvin Ortega MD    2020                     Vitals Value Taken Time   /60 2020 10:43 AM   Temp 37.1 °C (98.8 °F) 2020 10:04 AM   Pulse 97 2020 10:43 AM   Resp     SpO2 96 % 2020 10:58 AM   Vitals shown include unvalidated device data.

## 2020-02-26 NOTE — PROGRESS NOTES
1930 Rcv'd pt into my care from change of shift RN    2000 RA/lungs CTAB; IV 18g R wrist; SL; flushes easily; no ab tenderness; EFM/TOCO in place; FHTs 140s w/mod variability; ctx mild to palp; cook balloon in place; no vag bleeding/leakage of fluid; +2 pedal pulses bilat; +2 BLE reflexes; no clonus; pt denies HA/visual change/epigastric pain; pt rates pain as 3-4 on 0-10 scale; will cont to monitor maternal/fetal well being and notify CNM of change     2315 Cook balloon removed (80cc/80cc); SVE (Jen); pt 3/50/-3; EFM/TOCO removed; pt to shower/eat per CNM    0230 VSS; FHTs 130s w/mod variability; no ctx tracing/palpated; pt denies HA/visual change/pain; oxytocin started @ 2mU     0300 Oxytocin increased to 4mU    0310 Pt coughing; cepacol lozenges @ bedside    0330 Oxytocin increased to 6mU    0400  Oxytocin increased to 8mU; Stadol 2mg given through IV    0415  SVE (Jen); pt 3/60/-3    0430 Variable decels noted; CNM aware; pt repositioned to L lat; 250cc IV fluid bolus infusing    0500 IV bolus started for epidural    0515 Pt states she wants oxytocin off until after epidural placement; oxytocin off; CNM aware; anesthesia notified of pt request for epidural placement    0530 Anesthesia in rm w/pt    0540  Time out performed    0542   Catheter placed    0544 Test dose given    0545 Bolus given    0555  Epidural pump on    0600 Pt R tilt s/p epidural placement; EFM tracing maternal HR from 0541 to 0548 w/pt position for epidural; SVE (Jen); pt 3-4/80/-3; oxytocin restarted    0615  Pt sleeping soundly; no s/s pain/distress    0625 CNM in rm w/pt    0630 Pt supine for combs catheter placement    0631 FHTS down to 90s; pt positioned L lat; adjusting monitor    0632 EFM not tracing; audible decel noted;pt positioned R latl; adjusting monitor    8095 FHTs return to baseline    0645  Pt sleeping soundly in R lat w/pillow between knees

## 2020-02-26 NOTE — ANESTHESIA PREPROCEDURE EVALUATION
Relevant Problems   No relevant active problems       Anesthetic History   No history of anesthetic complications            Review of Systems / Medical History  Patient summary reviewed, nursing notes reviewed and pertinent labs reviewed    Pulmonary  Within defined limits                 Neuro/Psych         Headaches     Cardiovascular                  Exercise tolerance: >4 METS     GI/Hepatic/Renal  Within defined limits              Endo/Other  Within defined limits           Other Findings              Physical Exam    Airway  Mallampati: II  TM Distance: 4 - 6 cm  Neck ROM: normal range of motion   Mouth opening: Normal     Cardiovascular    Rhythm: regular  Rate: normal         Dental  No notable dental hx       Pulmonary                 Abdominal  GI exam deferred       Other Findings            Anesthetic Plan    ASA: 2  Anesthesia type: epidural            Anesthetic plan and risks discussed with: Patient and Family      Risks and benefits of epidural include but not limited to a headache (with risk of repair requiring blood patch), backache, bleeding, infection, nerve injury, paralysis, failure with need to replace, aand hemodynamic changes.

## 2020-02-26 NOTE — ANESTHESIA PROCEDURE NOTES
Epidural Block    Start time: 2/26/2020 5:40 AM  Performed by: Sandra Anderson MD  Authorized by: Sandra Anderson MD     Pre-Procedure  Indication: labor epidural    Preanesthetic Checklist: patient identified, risks and benefits discussed, anesthesia consent, site marked, patient being monitored, timeout performed and anesthesia consent    Timeout Time: 05:42        Epidural:   Patient position:  Seated  Prep region:  Lumbar  Prep: Patient draped and Chlorhexidine    Location:  L3-4    Needle and Epidural Catheter:   Needle Type:  Tuohy  Needle Gauge:  17 G  Injection Technique:  Loss of resistance using air  Attempts:  1  Catheter Size:  20 G  Catheter at Skin Depth (cm):  6  Depth in Epidural Space (cm):  5  Events: no blood with aspiration, no cerebrospinal fluid with aspiration, no paresthesia and negative aspiration test    Test Dose:  Negative    Assessment:   Catheter Secured:  Tegaderm and tape  Insertion:  Uncomplicated  Patient tolerance:  Patient tolerated the procedure well with no immediate complications  10 cc normal saline pf

## 2020-02-26 NOTE — OP NOTES
OPERATIVE NOTE    Date of Procedure: 2020   Preoperative Diagnosis: Breech, fetal intolerance to labor, IUP 39 weeks, Persistent Headache  Postoperative Diagnosis: Breech, fetal intolerance to labor, IUP 39 weeks, Persistent Headache    Procedure(s):   SECTION  Surgeon(s) and Role:     Ning Lay MD - Primary         Surgical Assistant: None    Surgical Staff:  Circ-1: Lauren Cotter RN  Scrub Tech-1: Carlos Guy  Scrub Tech-2: Cristian Kapadia  Event Time In Time Out   Incision Start 2878    Incision Close 4696      Anesthesia: Epidural   Estimated Blood Loss: 300 cc  Specimens: * No specimens in log *   Findings: viable female infant, Apgar's 11,7, Birth Weight 4#19FA  Complications: None  Implants: * No implants in log *      Procedure:   Patient was taken to the OR after informed consent had been obtained. Spinal epidural was then placed. She was then placed in a dorsal supine position with a left lateral tilt. She was prepped and draped in a sterile fashion. Time out was completed. Attention was turned to the abdomen and an Allis test confirmed adequate anesthesia. A Pfannenstiel skin incision was made 3 cm above the symphysis pubis. The incision was carried down to the fascia. The fascia was opened in the midline. The subcutaneous tissue and fascia were extended bilaterally. The rectus muscle was divided bluntly. The peritoneum was entered. The bladder blade was inserted. The uterus was scored in the lower uterine segment and then entered in the midline. The uterine incision was extended bilaterally, transversly. Clear fluid emanated. The fetal feet were delivered, followed by the body to the axilla, the left arm was flexed across the chest and delivered followed by the right arm. The head was flexed and the fetal head was delivered. The cord was clamped and cut. Cord blood was obtained. The placenta was manually extracted.  The uterus was cleared of all clot and debris. The incision was closed with 0 Vicryl in a running fashion. A second imbricating layer of 0 Vicryl was placed. Excellent hemostasis was noted. The fascia was closed in a running fashion. The skin was closed with Ensorb dissolvable sutures. The counts were correct. The mother and child tolerated the procedure well.      Evita Rachel DO

## 2020-02-26 NOTE — LACTATION NOTE
Infant latched and nursing well off and on for a few minutes. Mom educated on breastfeeding basics--hunger cues, feeding on demand, waking baby if baby sleeps too long between feeds, importance of skin to skin, positioning and latching, risk of pacifier use and supplemental feedings, and importance of rooming in--and use of log sheet. Mom also educated on benefits of breastfeeding for herself and baby. Mom verbalized understanding. No questions at this time. 1345 Attempted feed, but infant very sleepy and unable to latch. Encouraged to attempt in an hour.

## 2020-02-26 NOTE — PROGRESS NOTES
Labor Progress Note    Patient seen, fetal heart rate and contraction pattern evaluated. Physical Exam:  Pelvic: Cervix 3,   Effaced: 90%  Station:  -3     Intact  Contractions: Every 3 minutes, mild  Fetal Heart Rate: Reactive    Assessment:  Satisfactory labor progress. PLAN:  Reassuring fetal status, Continue plan for vaginal delivery, continue pitocin induction.     Taurus Patel CNM  2/26/2020  6:21 AM

## 2020-02-26 NOTE — LACTATION NOTE
[address]  [address]    NAME: LOIDA BAIRD                                            /AGE/SEX: 1961 - 57 - F  PHYSICIAN: Radha Piper M.D.                                    ADMIT DATE: 19  UNIT #: [id number]                                                ACCT #: [id number]                                                                    LOC//BED: [id number]                                             PHYSICIAN REPORT                                         HISTORY AND PHYSICAL                                         REPORT # : [id number]  History and Physical Exam  Patient Examined By  Carolyne Medeiros M.D.    19 11:44    Chief Complaint  Abdominal pain and distention    History of Present Illness  Patient is a 57-year-old female with a past medical history significant for diabetes, hypertension,  nonalcoholic cirrhosis of liver and other medical issues as mentioned below who presented to  [facility] ER with complaints of progressively worsening abdominal pain and  distention for last 2 to 3 days.  She describes the pain as severe 10 out of 10 at its worst,  diffuse radiating across her right flank to the back, sometimes stabbing in nature associated with  difficulty taking a deep breath.  She reports that the pain gets worse when she walks and her  breathing gets worse as well when she walks.  She denies any chest pain, fever or chills.  Denies  any cough.  She denies any nausea or vomiting, diarrhea, melena, hematochezia or hematemesis.  Denies any dysuria, hematuria frequency.  Patient reports that she was diagnosed with nonalcoholic  cirrhosis of liver about a year ago and has been following for Dr. Rascon as an outpatient.  She  saw GYN in the beginning of the July at which time she had a CT  This note was copied from a baby's chart. 1630 attempted to stimulate and wake  for feeding. Placed skin to skin on mom at 1640. Encouraged mom to keep  skin to skin until next feeding. Mom verbalized understanding. Will return. 46 infant latched and nursing well at [phone]. Mom needs encouragement to hold breast and position  without assistance from [facility]. scan of abdomen pelvis and was told  that she is stable.  In the ER patient was afebrile with a temperature of 98.6, pulse 91,  respiration 22, blood pressure 157/80 and pulse ox 98% on room air.  Platelets 121,000.  BUN 22,  creatinine 1.61 with a GFR of 33.  Patient's baseline creatinine from 2019 is about 1.2.  Her  GFR at that time was 50.  INR yesterday was 1.2.  Chest x-ray done in the ER was negative for any  acute changes but did show cardiomegaly.  CT scan of abdominal pelvis showed large amount of  ascites which appeared to have increased since last CT scan which was done on 10/29/2018.  Patient  did have an MRCP on 2019 which showed mild ascites.  Patient reports that she had all her upper  teeth removed about  3 weeks.  She had severe hypertension after the procedure and ended up in the  ER needing IV fluids.  Patient has since been doing okay.  Currently patient has severe abdominal  pain and is restless and that due to pain.  She has never had paracentesis before    Past Medical History  Nonalcoholic cirrhosis of liver diagnosed about a year ago.  DM2  Hypothyroidism  Depression  ALLEGRA  cholecystectomy    Family History    Cardiac edema    FATHER, , Age:60 years and older  FH: diabetes mellitus    FATHER, , Age:60 years and older    MOTHER  FH: hypertension    FATHER, , Age:60 years and older    Social History  Smoking Status:  Never smoker  Hx Smoking Exposure:  No  Current Smoke Exposure:  No  Have You Smoked in the Last 12:  No  Hx Alcohol Use:  No  Recreational Drug Use:  No  Living Arrangements:  With Parent    Allergies  Coded Allergies:       Penicillins (Verified  Allergy, Intermediate, RASH, 14)       Shellfish Allergy (Unverified  Allergy, Unknown, Anaphylaxis, 19)    Medications  Citalopram* 20 Mg Tab, 20 MG PO NIGHTLY  Glipizide 10 Mg Tab, 10 MG PO BID AC  Levothyroxine * 112 Mcg Tab, 112 MCG PO DAILY AC  Metformin Hydrochloride 1,000 Mg Tab, 1,000 MG  PO BIDMEALS  traZODone* 100 Mg Tab, 100 MG PO NIGHTLY    Review of Systems  14 review of systems was obtained and is negative except that mentioned in the HPI and PMH    Height/Weight/BMI  Height (Feet):  5  Height (Inches):  8  Weight (Pounds):  252.4  Weight (Kilograms):  100.00  Body Mass Index:  38.37    Physical Exam    Vital Signs        Date Time  Temp Pulse Resp B/P (MAP) Pulse Ox O2 Delivery O2 Flow Rate FiO2    8/7/19 08:40 98.0 84 16 142/62 (88) 96    8/7/19 03:29      Room Air      Physical Exam  Constitutional:  Awake, alert and oriented x3.  Mild distress due to pain  Vital Signs reviewed  HEENT: Normocephalic, atraumatic head,  PERRLA, no pallor,no icterus, No ENT discharge, mucus  membranes are moist.  Neck:  Supple, No JVD, carotid bruit or thyromegaly.  No cervical lymphadenopathy..  Respiratory:   Clear to auscultation, air entry  is equal bilateral  Cardiovascular:  Regular Rate and rhythm, S1 and S2 normal.  No murmurs, gallops or rubs.  1+  peripheral edema.  GI system: Abdomen is distended with ascites.  Diffuse tenderness.  Bowel sounds are present.  No  appreciable solid organ enlargement.  Voluntary guarding present.  No rebound or rigidity.  Extremities:  No cyanosis, clubbing or calf tenderness. No joint swelling or erythema.  Neurological: Cranial nerves II through XII are grossly intact.  Moves upper and lower extremities  at will.  Skin/Hair/Nails:  No obvious rashes.  Behavioral Health/Psych:  Affect normal, Mood normal, Speech normal.    Assessment and Plan  Abdominal pain with worsening ascites.  Concern for SBP, worsening of her cirrhosis of liver.  Patient has had recent dental work done with extraction of all upper teeth.  Nonalcoholic cirrhosis of liver with portal hypertension, suspected esophageal varices, spl  enomegaly, thrombocytopenia, hypoalbuminemia.  Acute kidney injury.  Concern for hepatorenal syndrome.  History of type 2 diabetes  mellitus.  Depression.  Hypothyroidism.    Plan  We will consult IR for paracentesis, both diagnostic and therapeutic.  Expect patient's pain to get  better.  Dilaudid 0.5 mg every 4 hours as needed which can be changed to oral pain medications  after the paracentesis.  Labs for ascitic fluid ordered.  Albumin infusion with paracentesis per  protocol.  Will order right upper quadrant ultrasound with Doppler to rule out any underlying  thrombosis.  GI consulted.  Nephrology consulted.  Monitor renal function.  Will check ammonia level.  Sliding scale insulin with Accu-Cheks.  Watch for hypoglycemia.  Will hold oral hypoglycemic agents  for now.  Resume other home medications.  DVT prophylaxis.  I am aware that patient's platelet count is low.  However patients with cirrhosis  however had a high risk of DVT therefore we will start the patient on Lovenox.  Activity as tolerated.  Fall precautions.  Diet as tolerated with sodium (2 g) and fluid restriction (2 L).  CODE STATUS: Full code.    Plan of care was discussed with the patient in detail    Laboratory Data                                             Laboratory Tests      Test   8/7/19  07:38 8/7/19  05:58 8/6/19  20:06 8/6/19  19:50    Bedside Glucose 146  H    White Blood Count  5.9    5.4    Red Blood Count  3.62  L  3.81    Hemoglobin  11.7  L  12.2    Hematocrit  34.1  L  36.0    Mean Corpuscular Volume  94.2    94.4    Mean Corpuscular Hemoglobin  32.4    31.9    Mean Corpuscular Hemoglobin  Concent   34.4     33.8      Red Cell Distribution Width  14.4    14.4    Platelet Count  121  L  121  L    Mean Platelet Volume  7.6    7.6    Neutrophils (%) (Auto)  59.3    63.3    Lymphocytes (%) (Auto)  22.8    23.3    Monocytes (%) (Auto)  13.4    9.2    Eosinophils (%) (Auto)  3.7    3.6    Basophils (%) (Auto)  0.8    0.6    Neutrophils # (Auto)  3.5    3.4    Lymphocytes # (Auto)  1.4    1.3    Monocytes # (Auto)  0.8    0.5    Eosinophils # (Auto)  0.2     0.2    Basophils # (Auto)  0.0    0.0    Sodium Level  140    140    Potassium Level  4.7    4.2    Chloride Level  108  H  106    Carbon Dioxide Level  25    21    Anion Gap  7  L  13    Blood Urea Nitrogen  23.0    22.0    Creatinine  1.81  H  1.61  H    Estimat Glomerular Filtration  Rate   29  L     33  L      BUN/Creatinine Ratio  12.7    13.7    Glucose Level  148  H  212  H    Calcium Level  8.7    8.9    Total Bilirubin  1.6  H  1.8  H    Aspartate Amino Transf  (AST/SGOT)   32     37      Alanine Aminotransferase  (ALT/SGPT)   19     19      Alkaline Phosphatase  181  H  212  H    Total Protein  6.2  L  6.7    Albumin  3.0  L  3.3  L    Albumin/Globulin Ratio  0.94  L  0.97  L    Lipase   19    Prothrombin Time    14.6  H    Prothromb Time International  Ratio       1.2  H      Activated Partial  Thromboplast Time       34              Carolyne Medeiros M.D.                       Aug 7, 2019 11:58    <Electronically signed by Carolyne Medeiros M.D.> 08/07/19 1425        ______________________________________________  DRAFT UNTIL SIGNED      CC: Carolyne Medeiros M.D.;

## 2020-02-27 LAB
HCT VFR BLD AUTO: 25.8 % (ref 35–45)
HGB BLD-MCNC: 8.1 G/DL (ref 12–16)

## 2020-02-27 PROCEDURE — 74011250636 HC RX REV CODE- 250/636: Performed by: MIDWIFE

## 2020-02-27 PROCEDURE — 74011250637 HC RX REV CODE- 250/637: Performed by: MIDWIFE

## 2020-02-27 PROCEDURE — 85014 HEMATOCRIT: CPT

## 2020-02-27 PROCEDURE — 65270000029 HC RM PRIVATE

## 2020-02-27 PROCEDURE — 85018 HEMOGLOBIN: CPT

## 2020-02-27 PROCEDURE — 74011250637 HC RX REV CODE- 250/637: Performed by: OBSTETRICS & GYNECOLOGY

## 2020-02-27 PROCEDURE — 36415 COLL VENOUS BLD VENIPUNCTURE: CPT

## 2020-02-27 RX ORDER — IBUPROFEN 400 MG/1
800 TABLET ORAL
Status: DISCONTINUED | OUTPATIENT
Start: 2020-02-27 | End: 2020-02-29 | Stop reason: HOSPADM

## 2020-02-27 RX ORDER — IBUPROFEN 800 MG/1
800 TABLET ORAL
Qty: 90 TAB | Refills: 0 | Status: SHIPPED | OUTPATIENT
Start: 2020-02-29

## 2020-02-27 RX ORDER — DOCUSATE SODIUM 100 MG/1
100 CAPSULE, LIQUID FILLED ORAL DAILY
Qty: 30 CAP | Refills: 0 | Status: SHIPPED | OUTPATIENT
Start: 2020-02-27 | End: 2020-05-27

## 2020-02-27 RX ORDER — OXYCODONE AND ACETAMINOPHEN 5; 325 MG/1; MG/1
1-2 TABLET ORAL
Qty: 25 TAB | Refills: 0 | Status: SHIPPED | OUTPATIENT
Start: 2020-02-27 | End: 2020-03-01

## 2020-02-27 RX ORDER — AMOXICILLIN 250 MG
1 CAPSULE ORAL DAILY PRN
Status: DISCONTINUED | OUTPATIENT
Start: 2020-02-27 | End: 2020-02-29 | Stop reason: HOSPADM

## 2020-02-27 RX ADMIN — SIMETHICONE CHEW TAB 80 MG 80 MG: 80 TABLET ORAL at 16:08

## 2020-02-27 RX ADMIN — LORATADINE 10 MG: 10 TABLET ORAL at 09:49

## 2020-02-27 RX ADMIN — KETOROLAC TROMETHAMINE 30 MG: 30 INJECTION, SOLUTION INTRAMUSCULAR at 04:14

## 2020-02-27 RX ADMIN — IBUPROFEN 800 MG: 400 TABLET, FILM COATED ORAL at 22:33

## 2020-02-27 RX ADMIN — Medication 10 ML: at 09:49

## 2020-02-27 RX ADMIN — ACETAMINOPHEN 650 MG: 325 TABLET ORAL at 13:42

## 2020-02-27 RX ADMIN — SENNOSIDES AND DOCUSATE SODIUM 1 TABLET: 8.6; 5 TABLET ORAL at 18:57

## 2020-02-27 RX ADMIN — ACETAMINOPHEN 650 MG: 325 TABLET ORAL at 18:57

## 2020-02-27 RX ADMIN — KETOROLAC TROMETHAMINE 30 MG: 30 INJECTION, SOLUTION INTRAMUSCULAR at 16:08

## 2020-02-27 RX ADMIN — KETOROLAC TROMETHAMINE 30 MG: 30 INJECTION, SOLUTION INTRAMUSCULAR at 09:48

## 2020-02-27 NOTE — PERIOP NOTES
Name:  Dara Mcgill  Age:  36 y.o. MRN:  [id number]  CSN:  [id number]  :  1985      Anesthesia Epidural [facility] Post-Op Rounding Note    Referring physician: Amandeep Padilla,*     Patient status post Procedure(s):   SECTION on 2020    POST OP Day # 1    Visit Vitals  BP 99/61 (BP 1 Location: Left arm, BP Patient Position: At rest)   Pulse 65   Temp 36.4 °C (97.6 °F)   Resp 18   Ht 5' 6\" (1.676 m)   Wt 84.4 kg (186 lb)   SpO2 99%   Breastfeeding Unknown   BMI 30.02 kg/m²          Patient rates pain 3 at rest and 5 with movement. Pain is subjectively rated by patient as mild. Pain location: lower abdomen    Pt denies residual lower extremity weakness/numbness, positional headache, low back pain and breathing difficulties. She has ambulated and voided without complications. Pain adequately controlled with current regimen. No apparent anesthetic complications.      Jenny Kimble DO

## 2020-02-27 NOTE — PROGRESS NOTES
Received Bedside and Verbal shift change report from GERMAN Suarez RN. Report included the following information SBAR, Kardex, Procedure Summary, Intake/Output, MAR and Recent Results. 1600: Shift reassessment performed, no issues or concerns at this time, will continue to monitor. 1920: Bedside and Verbal shift change report given to JORGITO Murdock RN (oncoming nurse). Report included the following information SBAR, Kardex, Procedure Summary, Intake/Output, MAR and Recent Results.

## 2020-02-27 NOTE — PROGRESS NOTES
Bedside and Verbal shift change report given to GERMAN Gerard (oncoming nurse) by RAKESH Aguirre RN (offgoing nurse).  Report included the following information SBAR, Kardex, Procedure Summary, Intake/Output, MAR, Accordion, Recent Results and Med Rec Status.

## 2020-02-27 NOTE — PROGRESS NOTES
Patient's combs successfully removed and up to the bathroom with assistance. Michelle care performed and patient safely ambulated back to bed with assistance. No complaints of pain or discomfort at this time. Will continue to monitor.

## 2020-02-27 NOTE — DISCHARGE SUMMARY
Discharge Summary     Patient: Mey Caro MRN: [id number]  SSN: [id number]    YOB: 1985  Age: 36 y.o. Sex: female       Admit Date: 2020    Discharge Date: 20    Admission Diagnoses: Normal IUP (intrauterine pregnancy) on prenatal ultrasound, third trimester [Z34.93]  Headache in pregnancy, antepartum, third trimester [O26.893, R51]    Discharge Diagnoses:   Problem List as of 2020 Never Reviewed          Codes Class Noted - Resolved    Postpartum care following  delivery ICD-10-CM: Z39.2  ICD-9-CM: V24.2  2020 - Present        Headache in pregnancy, antepartum, third trimester ICD-10-CM: O26.893, R51  ICD-9-CM: 646.83, 784.0  2020 - Present        Normal IUP (intrauterine pregnancy) on prenatal ultrasound, third trimester ICD-10-CM: Z34.93  ICD-9-CM: V22.2  2020 - Present               Discharge Condition: Good    Hospital Course: uncomplicated, c/s for breech presentation    Consults: None    Significant Diagnostic Studies: labs: na    Disposition: home    Discharge Medications:   Current Discharge Medication List      START taking these medications    Details   ibuprofen (MOTRIN) 800 mg tablet Take 1 Tab by mouth every eight (8) hours as needed for Pain. Qty: 90 Tab, Refills: 0      oxyCODONE-acetaminophen (PERCOCET) 5-325 mg per tablet Take 1-2 Tabs by mouth every four (4) hours as needed for Pain for up to 3 days. Max Daily Amount: 12 Tabs. Qty: 25 Tab, Refills: 0    Associated Diagnoses: Postpartum care following  delivery      docusate sodium (COLACE) 100 mg capsule Take 1 Cap by mouth daily for 90 days. Qty: 30 Cap, Refills: 0         CONTINUE these medications which have NOT CHANGED    Details   PNV No12-Iron-FA-DSS-OM-3 29 mg iron-1 mg -50 mg CPKD Take  by mouth. cetirizine (ZYRTEC) 10 mg tablet Take 10 mg by mouth daily as needed for Allergies.              Activity: Activity as tolerated and pelvic rest, no heavy lifting, no driving on narcotics  Diet: Regular Diet  Wound Care: Keep wound clean and dry    Follow-up Appointments   Procedures    FOLLOW UP VISIT Appointment in: 6 Weeks     Standing Status:   Standing     Number of Occurrences:   1     Order Specific Question:   Appointment in     Answer:   6 Weeks       Signed By: Edvin Hutton CNM     February 27, 2020

## 2020-02-27 NOTE — PROGRESS NOTES
Progress Note    Patient: Maureen Breaux MRN: [id number]     YOB: 1985  Age: 36 y.o. Subjective:     Postpartum Day: 1    The patient is feeling well. Pain is  well controlled with current medications. Baby is feeding via breast without difficulty. Urinary output is adequate. Objective:      Patient Vitals for the past 8 hrs:   BP Temp Pulse Resp   20 0457 99/61 97.6 °F (36.4 °C) 65 18     General:    alert, cooperative   Lochia:  appropriate   Uterine Fundus:   firm @ U-2   Perineum:  Intact    DVT Evaluation:  No evidence of DVT seen on physical exam.     Lab/Data Review:  Recent Results (from the past 24 hour(s))   HEMOGLOBIN    Collection Time: 20  4:30 AM   Result Value Ref Range    HGB 8.1 (L) 12.0 - 16.0 g/dL   HEMATOCRIT    Collection Time: 20  4:30 AM   Result Value Ref Range    HCT 25.8 (L) 35.0 - 45.0 %     All lab results for the last 24 hours reviewed. Assessment:     Delivery: Primary LTCS    Plan:     Doing well postpartum  delivery. Continue current postpartum care. Encouraged hydration, nutrition and ambulation. DC home tomorrow. Follow-up in office in 6 weeks. Call prn. Current Discharge Medication List      START taking these medications    Details   ibuprofen (MOTRIN) 800 mg tablet Take 1 Tab by mouth every eight (8) hours as needed for Pain. Qty: 90 Tab, Refills: 0      oxyCODONE-acetaminophen (PERCOCET) 5-325 mg per tablet Take 1-2 Tabs by mouth every four (4) hours as needed for Pain for up to 3 days. Max Daily Amount: 12 Tabs. Qty: 25 Tab, Refills: 0    Associated Diagnoses: Postpartum care following  delivery      docusate sodium (COLACE) 100 mg capsule Take 1 Cap by mouth daily for 90 days. Qty: 30 Cap, Refills: 0         CONTINUE these medications which have NOT CHANGED    Details   PNV No12-Iron-FA-DSS-OM-3 29 mg iron-1 mg -50 mg CPKD Take  by mouth.       cetirizine (ZYRTEC) 10 mg tablet Take 10 mg by mouth daily as needed for Allergies. Reviewed education: S/sx of DVT, mastitis, and pp depression. Also reviewed reasons to call including DVT, issues with breasts, fever above 101, pp depression, increased bleeding (pad/hr) after rest. Included continuing good hydration at home, diet, rest when baby is sleeping, and pericare.      Signed By: Andres Peres CNM     February 27, 2020

## 2020-02-27 NOTE — PROGRESS NOTES
6443 Received handoff report from RAKEHS Aguirre RN via [employer]. Patient in stable condition. Identification bands verified. Currently sleeping in room. FOB at bedside. No needs reported at this time. Will continue to monitor frequently. 1500 Bedside, Verbal and Written shift change report given to BRENDAN Jefferson RN (oncoming nurse) by GERMAN Gonsalez RN (offgoing nurse). Report included the following information SBAR, Kardex, Intake/Output, MAR and Recent Results.

## 2020-02-28 PROCEDURE — 74011250637 HC RX REV CODE- 250/637: Performed by: OBSTETRICS & GYNECOLOGY

## 2020-02-28 PROCEDURE — 77030036554

## 2020-02-28 PROCEDURE — 74011250637 HC RX REV CODE- 250/637: Performed by: MIDWIFE

## 2020-02-28 PROCEDURE — 65270000029 HC RM PRIVATE

## 2020-02-28 RX ADMIN — SIMETHICONE CHEW TAB 80 MG 80 MG: 80 TABLET ORAL at 15:48

## 2020-02-28 RX ADMIN — OXYCODONE HYDROCHLORIDE AND ACETAMINOPHEN 2 TABLET: 5; 325 TABLET ORAL at 12:56

## 2020-02-28 RX ADMIN — OXYCODONE HYDROCHLORIDE AND ACETAMINOPHEN 2 TABLET: 5; 325 TABLET ORAL at 03:10

## 2020-02-28 RX ADMIN — IBUPROFEN 800 MG: 400 TABLET, FILM COATED ORAL at 07:40

## 2020-02-28 RX ADMIN — IBUPROFEN 800 MG: 400 TABLET, FILM COATED ORAL at 22:35

## 2020-02-28 RX ADMIN — LORATADINE 10 MG: 10 TABLET ORAL at 08:42

## 2020-02-28 RX ADMIN — OXYCODONE HYDROCHLORIDE AND ACETAMINOPHEN 2 TABLET: 5; 325 TABLET ORAL at 19:44

## 2020-02-28 NOTE — PROGRESS NOTES
0720 Bedside and Verbal shift change report given to BRENDAN Simon (oncoming nurse) by Willistine Cheadle, RN (offgoing nurse). Report included the following information SBAR, Kardex, Procedure Summary, Intake/Output, MAR, Accordion and Recent Results. 2941 Assessment completed. Incision dressing still on. Advised Pt to take the dressing off in the shower. Pt in bed, family at bedside. Call bell within reach. 0915 Pt in shower. 0930 Incision assessment provided. Education on taken care of incision given Pt verbalizes understanding. Abdominal binder put on. Call bell within reach. 1405 Pt ambulating in the hallway. Pt's family member accompanying Pt.    1547 Assessment completed. Pt denies needs at this time. Call bell within reach. 1740 Pt ambulating in the hallway. Pt's family at site. 1920 Bedside and Verbal shift change report given to ALCON Kowalski RN (oncoming nurse) by BRENDAN Simon (offgoing nurse). Report included the following information SBAR, Kardex, Intake/Output, MAR, Accordion and Recent Results.

## 2020-02-28 NOTE — PROGRESS NOTES
Progress Note    Patient: Job Jarrell MRN: [id number]    YOB: 1985  Age: 36 y.o. Subjective:     Post-Operative Day: 2    The patient is feeling well. Pain is  well controlled with current medications. Urinary output is adequate. The patient is ambulating well and tolerating a regular diet. Pt is passing flatus. Baby is feeding via breast without difficulty. Objective:      Patient Vitals for the past 12 hrs:   Temp Pulse Resp BP SpO2   20 0841 97.9 °F (36.6 °C) 82 16 108/61 97 %       General:    alert, cooperative, no distress   Bowel Sounds:  active   Lochia:  appropriate   Uterine Fundus:    firm @ umbilicus    Incision:  Dry & Intact    DVT Evaluation:  No evidence of DVT seen on physical exam.  Negative Franky's sign. Lab/Data Review:  No results found for this or any previous visit (from the past 24 hour(s)). All lab results for the last 24 hours reviewed. Assessment:     Delivery: primary  section, low transverse incision     Plan:     Doing well postpartum  delivery. DC home. Follow-up in the office in 6 weeks. Call prn. Current Discharge Medication List      START taking these medications    Details   ibuprofen (MOTRIN) 800 mg tablet Take 1 Tab by mouth every eight (8) hours as needed for Pain. Qty: 90 Tab, Refills: 0      oxyCODONE-acetaminophen (PERCOCET) 5-325 mg per tablet Take 1-2 Tabs by mouth every four (4) hours as needed for Pain for up to 3 days. Max Daily Amount: 12 Tabs. Qty: 25 Tab, Refills: 0    Associated Diagnoses: Postpartum care following  delivery      docusate sodium (COLACE) 100 mg capsule Take 1 Cap by mouth daily for 90 days. Qty: 30 Cap, Refills: 0         CONTINUE these medications which have NOT CHANGED    Details   PNV No12-Iron-FA-DSS-OM-3 29 mg iron-1 mg -50 mg CPKD Take  by mouth. cetirizine (ZYRTEC) 10 mg tablet Take 10 mg by mouth daily as needed for Allergies.              Signed By: Narendra Vasquez GLORIA Carrion     February 28, 2020

## 2020-02-28 NOTE — PROGRESS NOTES
Discharge instructions, safety instructions and teaching given to patient. Patient verbalized understanding. Opportunity for questions provided.

## 2020-02-28 NOTE — PROGRESS NOTES
1920- Bedside and Verbal shift change report given to William Mckeon RN (oncoming nurse) by Serafin Meyers RN (offgoing nurse). Report included the following information SBAR, Intake/Output, MAR and Recent Results.

## 2020-02-28 NOTE — LACTATION NOTE
This note was copied from a baby's chart. 2155 per mom, infant latching and nursing well. No questions or concerns at this time. Will page for assistance if needed.

## 2020-02-28 NOTE — PROGRESS NOTES
Problem: Patient Education: Go to Patient Education Activity  Goal: Patient/Family Education  Outcome: Progressing Towards Goal     Problem: Pain  Goal: *Control of Pain  Outcome: Progressing Towards Goal  Goal: *PALLIATIVE CARE:  Alleviation of Pain  Outcome: Progressing Towards Goal     Problem: Patient Education: Go to Patient Education Activity  Goal: Patient/Family Education  Outcome: Progressing Towards Goal     Problem:  Delivery: Postpartum Day 1  Goal: Activity/Safety  Outcome: Progressing Towards Goal  Goal: Consults, if ordered  Outcome: Progressing Towards Goal  Goal: Diagnostic Test/Procedures  Outcome: Progressing Towards Goal  Goal: Nutrition/Diet  Outcome: Progressing Towards Goal  Goal: Discharge Planning  Outcome: Progressing Towards Goal  Goal: Medications  Outcome: Progressing Towards Goal  Goal: Respiratory  Outcome: Progressing Towards Goal  Goal: Treatments/Interventions/Procedures  Outcome: Progressing Towards Goal  Goal: Psychosocial  Outcome: Progressing Towards Goal  Goal: *Vital signs within defined limits  Outcome: Progressing Towards Goal  Goal: *Labs within defined limits  Outcome: Progressing Towards Goal  Goal: *Hemodynamically stable  Outcome: Progressing Towards Goal  Goal: *Optimal pain control at patient's stated goal  Outcome: Progressing Towards Goal  Goal: *Participates in infant care  Outcome: Progressing Towards Goal  Goal: *Demonstrates progressive activity  Outcome: Progressing Towards Goal  Goal: *Tolerating diet  Outcome: Progressing Towards Goal  Goal: *Performs self perineal care  Outcome: Progressing Towards Goal     Problem:  Delivery: Postpartum Day 2  Goal: Activity/Safety  Outcome: Progressing Towards Goal  Goal: Consults, if ordered  Outcome: Progressing Towards Goal  Goal: Nutrition/Diet  Outcome: Progressing Towards Goal  Goal: Discharge Planning  Outcome: Progressing Towards Goal  Goal: Medications  Outcome: Progressing Towards Goal  Goal: Treatments/Interventions/Procedures  Outcome: Progressing Towards Goal  Goal: Psychosocial  Outcome: Progressing Towards Goal  Goal: *Vital signs within defined limits  Outcome: Progressing Towards Goal  Goal: *Labs within defined limits  Outcome: Progressing Towards Goal  Goal: *Hemodynamically stable  Outcome: Progressing Towards Goal  Goal: *Optimal pain control at patient's stated goal  Outcome: Progressing Towards Goal  Goal: *Participates in infant care  Outcome: Progressing Towards Goal  Goal: *Demonstrates progressive activity  Outcome: Progressing Towards Goal  Goal: *Appropriate parent-infant bonding  Outcome: Progressing Towards Goal  Goal: *Tolerating diet  Outcome: Progressing Towards Goal     Problem:  Delivery: Postpartum Day 3  Goal: Activity/Safety  Outcome: Progressing Towards Goal  Goal: Consults, if ordered  Outcome: Progressing Towards Goal  Goal: Nutrition/Diet  Outcome: Progressing Towards Goal  Goal: Discharge Planning  Outcome: Progressing Towards Goal  Goal: Medications  Outcome: Progressing Towards Goal  Goal: Treatments/Interventions/Procedures  Outcome: Progressing Towards Goal  Goal: Psychosocial  Outcome: Progressing Towards Goal     Problem:  Delivery: Discharge Outcomes  Goal: *Follow-up appointments as indicated  Outcome: Progressing Towards Goal  Goal: *Describes available resources and support systems  Outcome: Progressing Towards Goal  Goal: *No signs and symptoms of infection  Outcome: Progressing Towards Goal  Goal: *Birth certificate information completed  Outcome: Progressing Towards Goal  Goal: *Received and verbalizes understanding of discharge plan and instructions  Outcome: Progressing Towards Goal  Goal: *Vital signs within defined limits  Outcome: Progressing Towards Goal  Goal: *Labs within defined limits  Outcome: Progressing Towards Goal  Goal: *Hemodynamically stable  Outcome: Progressing Towards Goal  Goal: *Optimal pain control at patient's stated goal  Outcome: Progressing Towards Goal  Goal: *Participates in infant care  Outcome: Progressing Towards Goal  Goal: *Demonstrates progressive activity  Outcome: Progressing Towards Goal  Goal: *Appropriate parent-infant bonding  Outcome: Progressing Towards Goal  Goal: *Tolerating diet  Outcome: Progressing Towards Goal  Goal: *Verbalizes name, dosage, time, side effects, and number of days to continue medications  Outcome: Progressing Towards Goal  Goal: *Influenza vaccine administered (October-March)  Outcome: Progressing Towards Goal     Problem: Vaginal Delivery: Postpartum 2  Goal: Off Pathway (Use only if patient is Off Pathway)  Outcome: Progressing Towards Goal  Goal: Activity/Safety  Outcome: Progressing Towards Goal  Goal: Consults, if ordered  Outcome: Progressing Towards Goal  Goal: Nutrition/Diet  Outcome: Progressing Towards Goal  Goal: Discharge Planning  Outcome: Progressing Towards Goal  Goal: Medications  Outcome: Progressing Towards Goal  Goal: Treatments/Interventions/Procedures  Outcome: Progressing Towards Goal  Goal: Psychosocial  Outcome: Progressing Towards Goal     Problem: Vaginal Delivery: Discharge Outcomes  Goal: *Verbalizes name, dosage, time, side effects, and number of days to continue medications  Outcome: Progressing Towards Goal  Goal: *Describes available resources and support systems  Outcome: Progressing Towards Goal  Goal: *No signs and symptoms of infection  Outcome: Progressing Towards Goal  Goal: *Birth certificate information completed  Outcome: Progressing Towards Goal  Goal: *Received and verbalizes understanding of discharge plan and instructions  Outcome: Progressing Towards Goal  Goal: *Vital signs within defined limits  Outcome: Progressing Towards Goal  Goal: *Labs within defined limits  Outcome: Progressing Towards Goal  Goal: *Hemodynamically stable  Outcome: Progressing Towards Goal  Goal: *Optimal pain control at patient's stated goal  Outcome: Progressing Towards Goal  Goal: *Participates in infant care  Outcome: Progressing Towards Goal  Goal: *Demonstrates progressive activity  Outcome: Progressing Towards Goal  Goal: *Appropriate parent-infant bonding  Outcome: Progressing Towards Goal  Goal: *Tolerating diet  Outcome: Progressing Towards Goal

## 2020-02-28 NOTE — PROGRESS NOTES
0720- Bedside and Verbal shift change report given to Marleny Noland RN (oncoming nurse) by Miles Milan RN (offgoing nurse). Report included the following information SBAR, Intake/Output, MAR and Recent Results.

## 2020-02-28 NOTE — PROGRESS NOTES
2035- Assessment performed without complications. VSS. Pt in no apparent distress or discomfort. 2200- Scheduled Toradol unable to be given. Resistance felt in IV when attempting to flush with NS. Pt states attempting to flush IV is painful. IV removed at this time per pt request.    2230- Pt rates pain 6/10. Motrin given without complications. 0010- Pt resting comfortably in bed with eyes closed. Chest expansion noted. Bed in lowest position, call bell within reach. 0130- Pt resting comfortably in bed with eyes closed. Chest expansion noted. Bed in lowest position, call bell within reach. 0310- Pt rates pain 7/10. 2 tab Percocet given without complications. 0400- Pt resting comfortably in bed with eyes closed. Chest expansion noted. Bed in lowest position, call bell within reach. 0630- Rounded on pt, laying comfortably in bed. Pt requested more ice water. No other needs expressed at this time.

## 2020-02-29 VITALS
TEMPERATURE: 97.8 F | HEART RATE: 81 BPM | BODY MASS INDEX: 29.89 KG/M2 | OXYGEN SATURATION: 99 % | DIASTOLIC BLOOD PRESSURE: 72 MMHG | WEIGHT: 186 LBS | HEIGHT: 66 IN | RESPIRATION RATE: 17 BRPM | SYSTOLIC BLOOD PRESSURE: 109 MMHG

## 2020-02-29 PROCEDURE — 74011250637 HC RX REV CODE- 250/637: Performed by: MIDWIFE

## 2020-02-29 RX ADMIN — OXYCODONE HYDROCHLORIDE AND ACETAMINOPHEN 2 TABLET: 5; 325 TABLET ORAL at 08:43

## 2020-02-29 RX ADMIN — LORATADINE 10 MG: 10 TABLET ORAL at 08:42

## 2020-02-29 RX ADMIN — OXYCODONE HYDROCHLORIDE AND ACETAMINOPHEN 2 TABLET: 5; 325 TABLET ORAL at 03:32

## 2020-02-29 NOTE — DISCHARGE INSTRUCTIONS
POST DELIVERY DISCHARGE INSTRUCTIONS    Name: Balbir Hess  YOB: 1985  Primary Diagnosis: Active Problems:    Headache in pregnancy, antepartum, third trimester (2020)      Normal IUP (intrauterine pregnancy) on prenatal ultrasound, third trimester (2020)      Postpartum care following  delivery (2020)        General:     Diet/Diet Restrictions:  Eight 8-ounce glasses of fluid daily (water, juices); avoid excessive caffeine intake. Meals/snacks as desired which are high in fiber and carbohydrates and low in fat and cholesterol. Physical Activity / Restrictions / Safety:     Avoid heavy lifting, no more than the baby alone (not the baby in the car seat). For 2-3 weeks; limit use of stairs to 2 times daily for the first week home. No driving for two weeks. Avoid intercourse until you complete your postpartum check. No douching or tampon use. Check with obstetrician before starting or resuming an exercise program.    Call your doctor for the following:     Fever over 101 degrees by mouth. Vaginal bleeding that soaks more than 2 pads in an hour for more than one hour or if the discharge starts to smell bad. Red streaks or increased swelling of legs, painful red streaks on your breast.  Foul discharge from your incision or the appearance or tender, red areas around it. If you feel extremely anxious or overwhelmed. If you have thoughts of harming yourself and/or your baby. Pain Management:     Pain Management:   Take Ibuprofen (Advil, Motrin), as directed for pain and use prescription narcotic pain medication as needed. Heating pad to  incision as needed.      Follow-Up Care:     Appointment with MD:   Follow-up Appointments   Procedures    FOLLOW UP VISIT Appointment in: 6 Weeks     Standing Status:   Standing     Number of Occurrences:   1     Order Specific Question:   Appointment in     Answer:   Melissa Mcgrath     Telephone number: [phone]      Signed By: Aisha Edmond GLORIA Copeland    Patient Education        Depression After Childbirth: Care Instructions  Your Care Instructions    Many women get the \"baby blues\" during the first few days after childbirth. You may lose sleep, feel irritable, and cry easily. You may feel happy one minute and sad the next. Hormone changes are one cause of these emotional changes. Also, the demands of a new baby, along with visits from relatives or other family needs, add to a mother's stress. The \"baby blues\" often peak around the fourth day. Then they ease up in less than 2 weeks. If your moodiness or anxiety lasts for more than 2 weeks, or if you feel like life is not worth living, you may have postpartum depression. This is different for each mother. Some mothers with serious depression may worry intensely about their infant's well-being. Others may feel distant from their child. Some mothers might even feel that they might harm their baby. A mother may have signs of paranoia, wondering if someone is watching her. Depression is not a sign of weakness. It is a medical condition that requires treatment. Medicine and counseling often work well to reduce depression. Talk to your doctor about taking antidepressant medicine while breastfeeding. Follow-up care is a key part of your treatment and safety. Be sure to make and go to all appointments, and call your doctor if you are having problems. It's also a good idea to know your test results and keep a list of the medicines you take. How do you know if you are depressed? With all the changes in your life, you may not know if you are depressed. Pregnancy sometimes causes changes in how you feel that are similar to the symptoms of depression. Symptoms of depression include:  · Feeling sad or hopeless and losing interest in daily activities. These are the most common symptoms of depression. · Sleeping too much or not enough. · Feeling tired. You may feel as if you have no energy.   · Eating too much or too little. · Writing or talking about death, such as writing suicide notes or talking about guns, knives, or pills. Keep the numbers for these national suicide hotlines: 8-519-679-TALK (1-713.773.4738) and 3-745-IURTAYC (2-618.777.9866). If you or someone you know talks about suicide or feeling hopeless, get help right away. How can you care for yourself at home? · Be safe with medicines. Take your medicines exactly as prescribed. Call your doctor if you think you are having a problem with your medicine. · Eat a healthy diet so that you can keep up your energy. · Get regular daily exercise, such as walks, to help improve your mood. · Get as much sunlight as possible. Keep your shades and curtains open. Get outside as much as you can. · Avoid using alcohol or other substances to feel better. · Get as much rest and sleep as possible. Avoid doing too much. Being too tired can increase depression. · Play stimulating music throughout your day and soothing music at night. · Schedule outings and visits with friends and family. Ask them to call you regularly, so that you do not feel alone. · Ask for help with preparing food and other daily tasks. Family and friends are often happy to help a mother with a . · Be honest with yourself and those who care about you. Tell them about your struggle. · Join a support group of new mothers. No one can better understand the challenges of caring for a  than other new mothers. · If you feel like life is not worth living or are feeling hopeless, get help right away. Keep the numbers for these national suicide hotlines: -TALK (2-629-967-839.472.2773) and 2-670-SAIMCWW (1-461.223.5719). When should you call for help? Call 911 anytime you think you may need emergency care.  For example, call if:    · You feel you cannot stop from hurting yourself, your baby, or someone else.   [facility] your doctor now or seek immediate medical care if:    · You are having trouble caring for yourself or your baby.     · You hear voices.   Arley Alcocer closely for changes in your health, and be sure to contact your doctor if:    · You have problems with your depression medicine.     · You do not get better as expected. Where can you learn more? Go to http://layla-avery.info/. Enter T960 in the search box to learn more about \"Depression After Childbirth: Care Instructions. \"  Current as of: May 28, 2019  Content Version: 12.2  © 4786-2730 [employer]. Care instructions adapted under license by [employer] (which disclaims liability or warranty for this information). If you have questions about a medical condition or this instruction, always ask your healthcare professional. [address] any warranty or liability for your use of this information.   Patient armband removed and shredded

## 2020-02-29 NOTE — PROGRESS NOTES
D/C teaching completed. Copy of D/C teaching/instructions given to Pt. Pt verbalizes understanding. Pt given opportunity for questions. Pt denies comments/concerns/questions at this time.

## 2020-02-29 NOTE — LACTATION NOTE
This note was copied from a baby's chart. 18 per mom, infant latching and nursing well. Breastfeeding discharge teaching completed to include feeding on demand, foremilk and hindmilk importance, engorgement, mastitis, clogged ducts, pumping, breastmilk storage, and returning to work. Information given about unit and office phone numbers and encouraged mom to reach out if concerns arise, but that  [facility] would be calling her in the next few days to follow up on breastfeeding. Mom verbalized understanding and no questions at this time.   called to the room. Per parents Eder Ho has been very fussy and crying for at least 45 minutes\". [place] would not latch and encouraged parents to calm  before latching.  Taught mom hand expressing and spoon fed 1 spoonful then was able to successfully latch  on Left breast.

## (undated) DEVICE — INTENDED FOR TISSUE SEPARATION, AND OTHER PROCEDURES THAT REQUIRE A SHARP SURGICAL BLADE TO PUNCTURE OR CUT.: Brand: BARD-PARKER ® CARBON RIB-BACK BLADES

## (undated) DEVICE — 3L THIN WALL CAN: Brand: CRD

## (undated) DEVICE — BSMI CSECTION BIRTHING PACK: Brand: MEDLINE INDUSTRIES, INC.

## (undated) DEVICE — GARMENT,MEDLINE,DVT,INT,CALF,MED, GEN2: Brand: MEDLINE

## (undated) DEVICE — SUTURE VCRL SZ 0 L36IN ABSRB UD L36MM CT-1 1/2 CIR J946H

## (undated) DEVICE — STAPLER SKIN SQ 30 ABSRB STPL DISP INSORB

## (undated) DEVICE — STERILE LATEX POWDER-FREE SURGICAL GLOVESWITH NITRILE COATING: Brand: PROTEXIS

## (undated) DEVICE — SOL IRRIGATION INJ NACL 0.9% 500ML BTL

## (undated) DEVICE — STRAP,POSITIONING,KNEE/BODY,FOAM,4X60": Brand: MEDLINE

## (undated) DEVICE — AGENT HEMSTAT 3GM PURIFIED PLNT STARCH PWD ABSRB ARISTA AH